# Patient Record
Sex: MALE | NOT HISPANIC OR LATINO | Employment: OTHER | URBAN - METROPOLITAN AREA
[De-identification: names, ages, dates, MRNs, and addresses within clinical notes are randomized per-mention and may not be internally consistent; named-entity substitution may affect disease eponyms.]

---

## 2021-10-14 ENCOUNTER — OFFICE VISIT (OUTPATIENT)
Dept: ORTHOPEDICS | Facility: CLINIC | Age: 66
End: 2021-10-14
Payer: MEDICARE

## 2021-10-14 DIAGNOSIS — M17.12 PRIMARY OSTEOARTHRITIS OF LEFT KNEE: ICD-10-CM

## 2021-10-14 DIAGNOSIS — M23.204 DEGENERATIVE TEAR OF LEFT MEDIAL MENISCUS: Primary | ICD-10-CM

## 2021-10-14 PROCEDURE — 1159F MED LIST DOCD IN RCRD: CPT | Mod: S$GLB,,, | Performed by: ORTHOPAEDIC SURGERY

## 2021-10-14 PROCEDURE — 20610 LARGE JOINT ASPIRATION/INJECTION: L KNEE: ICD-10-PCS | Mod: LT,S$GLB,, | Performed by: ORTHOPAEDIC SURGERY

## 2021-10-14 PROCEDURE — 1160F PR REVIEW ALL MEDS BY PRESCRIBER/CLIN PHARMACIST DOCUMENTED: ICD-10-PCS | Mod: S$GLB,,, | Performed by: ORTHOPAEDIC SURGERY

## 2021-10-14 PROCEDURE — 1101F PR PT FALLS ASSESS DOC 0-1 FALLS W/OUT INJ PAST YR: ICD-10-PCS | Mod: S$GLB,,, | Performed by: ORTHOPAEDIC SURGERY

## 2021-10-14 PROCEDURE — 1160F RVW MEDS BY RX/DR IN RCRD: CPT | Mod: S$GLB,,, | Performed by: ORTHOPAEDIC SURGERY

## 2021-10-14 PROCEDURE — 99203 PR OFFICE/OUTPT VISIT, NEW, LEVL III, 30-44 MIN: ICD-10-PCS | Mod: 25,S$GLB,, | Performed by: ORTHOPAEDIC SURGERY

## 2021-10-14 PROCEDURE — 20610 DRAIN/INJ JOINT/BURSA W/O US: CPT | Mod: LT,S$GLB,, | Performed by: ORTHOPAEDIC SURGERY

## 2021-10-14 PROCEDURE — 1101F PT FALLS ASSESS-DOCD LE1/YR: CPT | Mod: S$GLB,,, | Performed by: ORTHOPAEDIC SURGERY

## 2021-10-14 PROCEDURE — 3288F PR FALLS RISK ASSESSMENT DOCUMENTED: ICD-10-PCS | Mod: S$GLB,,, | Performed by: ORTHOPAEDIC SURGERY

## 2021-10-14 PROCEDURE — 3288F FALL RISK ASSESSMENT DOCD: CPT | Mod: S$GLB,,, | Performed by: ORTHOPAEDIC SURGERY

## 2021-10-14 PROCEDURE — 99203 OFFICE O/P NEW LOW 30 MIN: CPT | Mod: 25,S$GLB,, | Performed by: ORTHOPAEDIC SURGERY

## 2021-10-14 PROCEDURE — 1159F PR MEDICATION LIST DOCUMENTED IN MEDICAL RECORD: ICD-10-PCS | Mod: S$GLB,,, | Performed by: ORTHOPAEDIC SURGERY

## 2021-10-14 RX ORDER — TRIAMCINOLONE ACETONIDE 40 MG/ML
40 INJECTION, SUSPENSION INTRA-ARTICULAR; INTRAMUSCULAR
Status: DISCONTINUED | OUTPATIENT
Start: 2021-10-14 | End: 2021-10-14 | Stop reason: HOSPADM

## 2021-10-14 RX ORDER — OMEGA-3-ACID ETHYL ESTERS 1 G/1
2 CAPSULE, LIQUID FILLED ORAL DAILY
COMMUNITY
Start: 2021-05-08 | End: 2022-07-18 | Stop reason: SDUPTHER

## 2021-10-14 RX ADMIN — TRIAMCINOLONE ACETONIDE 40 MG: 40 INJECTION, SUSPENSION INTRA-ARTICULAR; INTRAMUSCULAR at 10:10

## 2021-11-11 ENCOUNTER — OFFICE VISIT (OUTPATIENT)
Dept: ORTHOPEDICS | Facility: CLINIC | Age: 66
End: 2021-11-11
Payer: MEDICARE

## 2021-11-11 VITALS — WEIGHT: 190 LBS | BODY MASS INDEX: 28.79 KG/M2 | HEIGHT: 68 IN

## 2021-11-11 DIAGNOSIS — M17.12 PRIMARY OSTEOARTHRITIS OF LEFT KNEE: ICD-10-CM

## 2021-11-11 DIAGNOSIS — M23.204 DEGENERATIVE TEAR OF LEFT MEDIAL MENISCUS: Primary | ICD-10-CM

## 2021-11-11 PROCEDURE — 1101F PT FALLS ASSESS-DOCD LE1/YR: CPT | Mod: S$GLB,,, | Performed by: ORTHOPAEDIC SURGERY

## 2021-11-11 PROCEDURE — 3288F FALL RISK ASSESSMENT DOCD: CPT | Mod: S$GLB,,, | Performed by: ORTHOPAEDIC SURGERY

## 2021-11-11 PROCEDURE — 1159F PR MEDICATION LIST DOCUMENTED IN MEDICAL RECORD: ICD-10-PCS | Mod: S$GLB,,, | Performed by: ORTHOPAEDIC SURGERY

## 2021-11-11 PROCEDURE — 99213 OFFICE O/P EST LOW 20 MIN: CPT | Mod: S$GLB,,, | Performed by: ORTHOPAEDIC SURGERY

## 2021-11-11 PROCEDURE — 3008F BODY MASS INDEX DOCD: CPT | Mod: S$GLB,,, | Performed by: ORTHOPAEDIC SURGERY

## 2021-11-11 PROCEDURE — 99213 PR OFFICE/OUTPT VISIT, EST, LEVL III, 20-29 MIN: ICD-10-PCS | Mod: S$GLB,,, | Performed by: ORTHOPAEDIC SURGERY

## 2021-11-11 PROCEDURE — 1160F PR REVIEW ALL MEDS BY PRESCRIBER/CLIN PHARMACIST DOCUMENTED: ICD-10-PCS | Mod: S$GLB,,, | Performed by: ORTHOPAEDIC SURGERY

## 2021-11-11 PROCEDURE — 1125F PR PAIN SEVERITY QUANTIFIED, PAIN PRESENT: ICD-10-PCS | Mod: S$GLB,,, | Performed by: ORTHOPAEDIC SURGERY

## 2021-11-11 PROCEDURE — 1125F AMNT PAIN NOTED PAIN PRSNT: CPT | Mod: S$GLB,,, | Performed by: ORTHOPAEDIC SURGERY

## 2021-11-11 PROCEDURE — 3288F PR FALLS RISK ASSESSMENT DOCUMENTED: ICD-10-PCS | Mod: S$GLB,,, | Performed by: ORTHOPAEDIC SURGERY

## 2021-11-11 PROCEDURE — 1101F PR PT FALLS ASSESS DOC 0-1 FALLS W/OUT INJ PAST YR: ICD-10-PCS | Mod: S$GLB,,, | Performed by: ORTHOPAEDIC SURGERY

## 2021-11-11 PROCEDURE — 3008F PR BODY MASS INDEX (BMI) DOCUMENTED: ICD-10-PCS | Mod: S$GLB,,, | Performed by: ORTHOPAEDIC SURGERY

## 2021-11-11 PROCEDURE — 1160F RVW MEDS BY RX/DR IN RCRD: CPT | Mod: S$GLB,,, | Performed by: ORTHOPAEDIC SURGERY

## 2021-11-11 PROCEDURE — 1159F MED LIST DOCD IN RCRD: CPT | Mod: S$GLB,,, | Performed by: ORTHOPAEDIC SURGERY

## 2021-11-19 ENCOUNTER — TELEPHONE (OUTPATIENT)
Dept: ORTHOPEDICS | Facility: CLINIC | Age: 66
End: 2021-11-19
Payer: MEDICARE

## 2021-11-22 ENCOUNTER — HOSPITAL ENCOUNTER (OUTPATIENT)
Dept: RADIOLOGY | Facility: HOSPITAL | Age: 66
Discharge: HOME | End: 2021-11-22
Attending: ORTHOPAEDIC SURGERY
Payer: MEDICARE

## 2021-11-22 ENCOUNTER — HOSPITAL ENCOUNTER (OUTPATIENT)
Dept: PREADMISSION TESTING | Facility: HOSPITAL | Age: 66
Discharge: HOME | End: 2021-11-22
Attending: ORTHOPAEDIC SURGERY
Payer: MEDICARE

## 2021-11-22 VITALS
WEIGHT: 190 LBS | BODY MASS INDEX: 28.79 KG/M2 | HEART RATE: 89 BPM | SYSTOLIC BLOOD PRESSURE: 138 MMHG | HEIGHT: 68 IN | TEMPERATURE: 99 F | OXYGEN SATURATION: 97 % | RESPIRATION RATE: 18 BRPM | DIASTOLIC BLOOD PRESSURE: 95 MMHG

## 2021-11-22 DIAGNOSIS — M23.204 DEGENERATIVE TEAR OF LEFT MEDIAL MENISCUS: ICD-10-CM

## 2021-11-22 DIAGNOSIS — M17.12 PRIMARY OSTEOARTHRITIS OF LEFT KNEE: ICD-10-CM

## 2021-11-22 PROCEDURE — 93005 ELECTROCARDIOGRAM TRACING: CPT | Performed by: INTERNAL MEDICINE

## 2021-11-22 PROCEDURE — 93010 ELECTROCARDIOGRAM REPORT: CPT | Mod: ,,, | Performed by: INTERNAL MEDICINE

## 2021-11-22 PROCEDURE — 93010 EKG 12-LEAD: ICD-10-PCS | Mod: ,,, | Performed by: INTERNAL MEDICINE

## 2021-11-22 PROCEDURE — 71046 X-RAY EXAM CHEST 2 VIEWS: CPT | Mod: TC

## 2021-11-24 ENCOUNTER — HOSPITAL ENCOUNTER (OUTPATIENT)
Facility: HOSPITAL | Age: 66
Discharge: HOME | End: 2021-11-24
Attending: ORTHOPAEDIC SURGERY | Admitting: ORTHOPAEDIC SURGERY
Payer: MEDICARE

## 2021-11-24 ENCOUNTER — ANESTHESIA EVENT (OUTPATIENT)
Dept: SURGERY | Facility: HOSPITAL | Age: 66
End: 2021-11-24
Payer: MEDICARE

## 2021-11-24 ENCOUNTER — ANESTHESIA (OUTPATIENT)
Dept: SURGERY | Facility: HOSPITAL | Age: 66
End: 2021-11-24
Payer: MEDICARE

## 2021-11-24 VITALS
RESPIRATION RATE: 14 BRPM | SYSTOLIC BLOOD PRESSURE: 144 MMHG | TEMPERATURE: 98 F | OXYGEN SATURATION: 98 % | BODY MASS INDEX: 28.74 KG/M2 | HEIGHT: 68 IN | WEIGHT: 189.63 LBS | DIASTOLIC BLOOD PRESSURE: 84 MMHG | HEART RATE: 74 BPM

## 2021-11-24 DIAGNOSIS — M23.204 DEGENERATIVE TEAR OF LEFT MEDIAL MENISCUS: Primary | ICD-10-CM

## 2021-11-24 DIAGNOSIS — M17.12 PRIMARY OSTEOARTHRITIS OF LEFT KNEE: ICD-10-CM

## 2021-11-24 LAB — SARS-COV-2 RDRP RESP QL NAA+PROBE: NEGATIVE

## 2021-11-24 PROCEDURE — 25000003 PHARM REV CODE 250: Performed by: ORTHOPAEDIC SURGERY

## 2021-11-24 PROCEDURE — 27202107 HC XP QUATRO SENSOR: Performed by: NURSE ANESTHETIST, CERTIFIED REGISTERED

## 2021-11-24 PROCEDURE — 71000015 HC POSTOP RECOV 1ST HR: Performed by: ORTHOPAEDIC SURGERY

## 2021-11-24 PROCEDURE — 27200651 HC AIRWAY, LMA: Performed by: NURSE ANESTHETIST, CERTIFIED REGISTERED

## 2021-11-24 PROCEDURE — 63600175 PHARM REV CODE 636 W HCPCS: Performed by: ORTHOPAEDIC SURGERY

## 2021-11-24 PROCEDURE — 25000003 PHARM REV CODE 250: Performed by: NURSE ANESTHETIST, CERTIFIED REGISTERED

## 2021-11-24 PROCEDURE — 36000710: Performed by: ORTHOPAEDIC SURGERY

## 2021-11-24 PROCEDURE — 63600175 PHARM REV CODE 636 W HCPCS: Performed by: NURSE ANESTHETIST, CERTIFIED REGISTERED

## 2021-11-24 PROCEDURE — 71000033 HC RECOVERY, INTIAL HOUR: Performed by: ORTHOPAEDIC SURGERY

## 2021-11-24 PROCEDURE — 37000008 HC ANESTHESIA 1ST 15 MINUTES: Performed by: ORTHOPAEDIC SURGERY

## 2021-11-24 PROCEDURE — 36000711: Performed by: ORTHOPAEDIC SURGERY

## 2021-11-24 PROCEDURE — U0002 COVID-19 LAB TEST NON-CDC: HCPCS | Performed by: ORTHOPAEDIC SURGERY

## 2021-11-24 PROCEDURE — 27000671 HC TUBING MICROBORE EXT: Performed by: NURSE ANESTHETIST, CERTIFIED REGISTERED

## 2021-11-24 PROCEDURE — 27000673 HC TUBING BLOOD Y: Performed by: NURSE ANESTHETIST, CERTIFIED REGISTERED

## 2021-11-24 PROCEDURE — 27000284 HC CANNULA NASAL: Performed by: NURSE ANESTHETIST, CERTIFIED REGISTERED

## 2021-11-24 PROCEDURE — 25000003 PHARM REV CODE 250: Performed by: ANESTHESIOLOGY

## 2021-11-24 PROCEDURE — 27202103: Performed by: NURSE ANESTHETIST, CERTIFIED REGISTERED

## 2021-11-24 PROCEDURE — 27201423 OPTIME MED/SURG SUP & DEVICES STERILE SUPPLY: Performed by: ORTHOPAEDIC SURGERY

## 2021-11-24 PROCEDURE — 37000009 HC ANESTHESIA EA ADD 15 MINS: Performed by: ORTHOPAEDIC SURGERY

## 2021-11-24 PROCEDURE — 27000653 HC CATH, IV CATHLN: Performed by: NURSE ANESTHETIST, CERTIFIED REGISTERED

## 2021-11-24 RX ORDER — ONDANSETRON 2 MG/ML
INJECTION INTRAMUSCULAR; INTRAVENOUS
Status: DISCONTINUED | OUTPATIENT
Start: 2021-11-24 | End: 2021-11-24

## 2021-11-24 RX ORDER — OXYCODONE AND ACETAMINOPHEN 7.5; 325 MG/1; MG/1
1 TABLET ORAL EVERY 4 HOURS PRN
Qty: 28 TABLET | Refills: 0 | Status: SHIPPED | OUTPATIENT
Start: 2021-11-24

## 2021-11-24 RX ORDER — DIPHENHYDRAMINE HYDROCHLORIDE 50 MG/ML
12.5 INJECTION INTRAMUSCULAR; INTRAVENOUS
Status: DISCONTINUED | OUTPATIENT
Start: 2021-11-24 | End: 2021-11-24 | Stop reason: HOSPADM

## 2021-11-24 RX ORDER — ACETAMINOPHEN 10 MG/ML
INJECTION, SOLUTION INTRAVENOUS
Status: DISCONTINUED | OUTPATIENT
Start: 2021-11-24 | End: 2021-11-24

## 2021-11-24 RX ORDER — SODIUM CHLORIDE 0.9 % (FLUSH) 0.9 %
10 SYRINGE (ML) INJECTION
Status: DISCONTINUED | OUTPATIENT
Start: 2021-11-24 | End: 2021-11-24 | Stop reason: HOSPADM

## 2021-11-24 RX ORDER — FENTANYL CITRATE 50 UG/ML
INJECTION, SOLUTION INTRAMUSCULAR; INTRAVENOUS
Status: DISCONTINUED | OUTPATIENT
Start: 2021-11-24 | End: 2021-11-24

## 2021-11-24 RX ORDER — LIDOCAINE HYDROCHLORIDE 20 MG/ML
INJECTION, SOLUTION EPIDURAL; INFILTRATION; INTRACAUDAL; PERINEURAL
Status: DISCONTINUED | OUTPATIENT
Start: 2021-11-24 | End: 2021-11-24

## 2021-11-24 RX ORDER — METHYLPREDNISOLONE ACETATE 80 MG/ML
INJECTION, SUSPENSION INTRA-ARTICULAR; INTRALESIONAL; INTRAMUSCULAR; SOFT TISSUE
Status: DISCONTINUED | OUTPATIENT
Start: 2021-11-24 | End: 2021-11-24 | Stop reason: HOSPADM

## 2021-11-24 RX ORDER — PROPOFOL 10 MG/ML
VIAL (ML) INTRAVENOUS
Status: DISCONTINUED | OUTPATIENT
Start: 2021-11-24 | End: 2021-11-24

## 2021-11-24 RX ORDER — DEXAMETHASONE SODIUM PHOSPHATE 4 MG/ML
INJECTION, SOLUTION INTRA-ARTICULAR; INTRALESIONAL; INTRAMUSCULAR; INTRAVENOUS; SOFT TISSUE
Status: DISCONTINUED | OUTPATIENT
Start: 2021-11-24 | End: 2021-11-24

## 2021-11-24 RX ORDER — SODIUM CHLORIDE, SODIUM LACTATE, POTASSIUM CHLORIDE, CALCIUM CHLORIDE 600; 310; 30; 20 MG/100ML; MG/100ML; MG/100ML; MG/100ML
INJECTION, SOLUTION INTRAVENOUS CONTINUOUS PRN
Status: DISCONTINUED | OUTPATIENT
Start: 2021-11-24 | End: 2021-11-24

## 2021-11-24 RX ORDER — HYDROCODONE BITARTRATE AND ACETAMINOPHEN 5; 325 MG/1; MG/1
1 TABLET ORAL EVERY 4 HOURS PRN
Status: CANCELLED | OUTPATIENT
Start: 2021-11-24

## 2021-11-24 RX ORDER — MUPIROCIN 20 MG/G
1 OINTMENT TOPICAL 2 TIMES DAILY
Status: CANCELLED | OUTPATIENT
Start: 2021-11-24 | End: 2021-11-29

## 2021-11-24 RX ORDER — BUPIVACAINE HYDROCHLORIDE AND EPINEPHRINE 5; 5 MG/ML; UG/ML
INJECTION, SOLUTION EPIDURAL; INTRACAUDAL; PERINEURAL
Status: DISCONTINUED | OUTPATIENT
Start: 2021-11-24 | End: 2021-11-24 | Stop reason: HOSPADM

## 2021-11-24 RX ORDER — CEFAZOLIN SODIUM 2 G/50ML
2 SOLUTION INTRAVENOUS
Status: COMPLETED | OUTPATIENT
Start: 2021-11-24 | End: 2021-11-24

## 2021-11-24 RX ORDER — HYDROMORPHONE HYDROCHLORIDE 1 MG/ML
0.2 INJECTION, SOLUTION INTRAMUSCULAR; INTRAVENOUS; SUBCUTANEOUS
Status: DISCONTINUED | OUTPATIENT
Start: 2021-11-24 | End: 2021-11-24 | Stop reason: HOSPADM

## 2021-11-24 RX ORDER — OXYCODONE HYDROCHLORIDE 5 MG/1
5 TABLET ORAL
Status: DISCONTINUED | OUTPATIENT
Start: 2021-11-24 | End: 2021-11-24 | Stop reason: HOSPADM

## 2021-11-24 RX ORDER — MIDAZOLAM HYDROCHLORIDE 1 MG/ML
INJECTION INTRAMUSCULAR; INTRAVENOUS
Status: DISCONTINUED | OUTPATIENT
Start: 2021-11-24 | End: 2021-11-24

## 2021-11-24 RX ORDER — ONDANSETRON 2 MG/ML
4 INJECTION INTRAMUSCULAR; INTRAVENOUS DAILY PRN
Status: DISCONTINUED | OUTPATIENT
Start: 2021-11-24 | End: 2021-11-24 | Stop reason: HOSPADM

## 2021-11-24 RX ORDER — LIDOCAINE HYDROCHLORIDE 20 MG/ML
JELLY TOPICAL
Status: DISCONTINUED | OUTPATIENT
Start: 2021-11-24 | End: 2021-11-24

## 2021-11-24 RX ORDER — MEPERIDINE HYDROCHLORIDE 50 MG/ML
12.5 INJECTION INTRAMUSCULAR; INTRAVENOUS; SUBCUTANEOUS EVERY 10 MIN PRN
Status: DISCONTINUED | OUTPATIENT
Start: 2021-11-24 | End: 2021-11-24 | Stop reason: HOSPADM

## 2021-11-24 RX ADMIN — SODIUM CHLORIDE, SODIUM LACTATE, POTASSIUM CHLORIDE, AND CALCIUM CHLORIDE: .6; .31; .03; .02 INJECTION, SOLUTION INTRAVENOUS at 11:11

## 2021-11-24 RX ADMIN — LIDOCAINE HYDROCHLORIDE 60 MG: 20 INJECTION, SOLUTION EPIDURAL; INFILTRATION; INTRACAUDAL; PERINEURAL at 12:11

## 2021-11-24 RX ADMIN — DEXAMETHASONE SODIUM PHOSPHATE 8 MG: 4 INJECTION, SOLUTION INTRAMUSCULAR; INTRAVENOUS at 12:11

## 2021-11-24 RX ADMIN — FENTANYL CITRATE 100 MCG: 50 INJECTION INTRAMUSCULAR; INTRAVENOUS at 12:11

## 2021-11-24 RX ADMIN — ACETAMINOPHEN 1000 MG: 10 INJECTION, SOLUTION INTRAVENOUS at 11:11

## 2021-11-24 RX ADMIN — CEFAZOLIN SODIUM 2 G: 2 SOLUTION INTRAVENOUS at 11:11

## 2021-11-24 RX ADMIN — OXYCODONE HYDROCHLORIDE 5 MG: 5 TABLET ORAL at 01:11

## 2021-11-24 RX ADMIN — SODIUM CHLORIDE, SODIUM LACTATE, POTASSIUM CHLORIDE, AND CALCIUM CHLORIDE: .6; .31; .03; .02 INJECTION, SOLUTION INTRAVENOUS at 12:11

## 2021-11-24 RX ADMIN — LIDOCAINE HYDROCHLORIDE 4 ML: 20 JELLY TOPICAL at 12:11

## 2021-11-24 RX ADMIN — ONDANSETRON 4 MG: 2 INJECTION INTRAMUSCULAR; INTRAVENOUS at 12:11

## 2021-11-24 RX ADMIN — PROPOFOL 200 MG: 10 INJECTION, EMULSION INTRAVENOUS at 12:11

## 2021-11-24 RX ADMIN — MIDAZOLAM HYDROCHLORIDE 2 MG: 1 INJECTION, SOLUTION INTRAMUSCULAR; INTRAVENOUS at 12:11

## 2021-12-08 ENCOUNTER — OFFICE VISIT (OUTPATIENT)
Dept: ORTHOPEDICS | Facility: CLINIC | Age: 66
End: 2021-12-08
Payer: MEDICARE

## 2021-12-08 VITALS — HEART RATE: 84 BPM | HEIGHT: 68 IN | WEIGHT: 189 LBS | OXYGEN SATURATION: 100 % | BODY MASS INDEX: 28.64 KG/M2

## 2021-12-08 DIAGNOSIS — Z98.890 S/P LEFT KNEE ARTHROSCOPY: Primary | ICD-10-CM

## 2021-12-08 PROCEDURE — 99024 PR POST-OP FOLLOW-UP VISIT: ICD-10-PCS | Mod: S$GLB,,, | Performed by: PHYSICIAN ASSISTANT

## 2021-12-08 PROCEDURE — 99024 POSTOP FOLLOW-UP VISIT: CPT | Mod: S$GLB,,, | Performed by: PHYSICIAN ASSISTANT

## 2022-01-06 ENCOUNTER — OFFICE VISIT (OUTPATIENT)
Dept: ORTHOPEDICS | Facility: CLINIC | Age: 67
End: 2022-01-06
Payer: MEDICARE

## 2022-01-06 VITALS
HEIGHT: 68 IN | SYSTOLIC BLOOD PRESSURE: 142 MMHG | WEIGHT: 180 LBS | DIASTOLIC BLOOD PRESSURE: 94 MMHG | BODY MASS INDEX: 27.28 KG/M2

## 2022-01-06 DIAGNOSIS — Z98.890 S/P LEFT KNEE ARTHROSCOPY: Primary | ICD-10-CM

## 2022-01-06 PROCEDURE — 99024 PR POST-OP FOLLOW-UP VISIT: ICD-10-PCS | Mod: S$GLB,,, | Performed by: ORTHOPAEDIC SURGERY

## 2022-01-06 PROCEDURE — 3008F BODY MASS INDEX DOCD: CPT | Mod: S$GLB,,, | Performed by: ORTHOPAEDIC SURGERY

## 2022-01-06 PROCEDURE — 1100F PTFALLS ASSESS-DOCD GE2>/YR: CPT | Mod: S$GLB,,, | Performed by: ORTHOPAEDIC SURGERY

## 2022-01-06 PROCEDURE — 3077F SYST BP >= 140 MM HG: CPT | Mod: S$GLB,,, | Performed by: ORTHOPAEDIC SURGERY

## 2022-01-06 PROCEDURE — 1159F PR MEDICATION LIST DOCUMENTED IN MEDICAL RECORD: ICD-10-PCS | Mod: S$GLB,,, | Performed by: ORTHOPAEDIC SURGERY

## 2022-01-06 PROCEDURE — 1100F PR PT FALLS ASSESS DOC 2+ FALLS/FALL W/INJURY/YR: ICD-10-PCS | Mod: S$GLB,,, | Performed by: ORTHOPAEDIC SURGERY

## 2022-01-06 PROCEDURE — 3077F PR MOST RECENT SYSTOLIC BLOOD PRESSURE >= 140 MM HG: ICD-10-PCS | Mod: S$GLB,,, | Performed by: ORTHOPAEDIC SURGERY

## 2022-01-06 PROCEDURE — 99024 POSTOP FOLLOW-UP VISIT: CPT | Mod: S$GLB,,, | Performed by: ORTHOPAEDIC SURGERY

## 2022-01-06 PROCEDURE — 3080F PR MOST RECENT DIASTOLIC BLOOD PRESSURE >= 90 MM HG: ICD-10-PCS | Mod: S$GLB,,, | Performed by: ORTHOPAEDIC SURGERY

## 2022-01-06 PROCEDURE — 3080F DIAST BP >= 90 MM HG: CPT | Mod: S$GLB,,, | Performed by: ORTHOPAEDIC SURGERY

## 2022-01-06 PROCEDURE — 1160F PR REVIEW ALL MEDS BY PRESCRIBER/CLIN PHARMACIST DOCUMENTED: ICD-10-PCS | Mod: S$GLB,,, | Performed by: ORTHOPAEDIC SURGERY

## 2022-01-06 PROCEDURE — 1159F MED LIST DOCD IN RCRD: CPT | Mod: S$GLB,,, | Performed by: ORTHOPAEDIC SURGERY

## 2022-01-06 PROCEDURE — 3288F FALL RISK ASSESSMENT DOCD: CPT | Mod: S$GLB,,, | Performed by: ORTHOPAEDIC SURGERY

## 2022-01-06 PROCEDURE — 3008F PR BODY MASS INDEX (BMI) DOCUMENTED: ICD-10-PCS | Mod: S$GLB,,, | Performed by: ORTHOPAEDIC SURGERY

## 2022-01-06 PROCEDURE — 1160F RVW MEDS BY RX/DR IN RCRD: CPT | Mod: S$GLB,,, | Performed by: ORTHOPAEDIC SURGERY

## 2022-01-06 PROCEDURE — 1126F PR PAIN SEVERITY QUANTIFIED, NO PAIN PRESENT: ICD-10-PCS | Mod: S$GLB,,, | Performed by: ORTHOPAEDIC SURGERY

## 2022-01-06 PROCEDURE — 3288F PR FALLS RISK ASSESSMENT DOCUMENTED: ICD-10-PCS | Mod: S$GLB,,, | Performed by: ORTHOPAEDIC SURGERY

## 2022-01-06 PROCEDURE — 1126F AMNT PAIN NOTED NONE PRSNT: CPT | Mod: S$GLB,,, | Performed by: ORTHOPAEDIC SURGERY

## 2022-01-06 NOTE — PROGRESS NOTES
[facility] ORTHOPEDICS POST-OP NOTE    Subjective:           Chief Complaint:   Chief Complaint   Patient presents with    Left Knee - Post-op Evaluation     Lt knee Scope 11/24/21. Pt is doing well, no pain, Stiffness with activity. Fell 2 weeks ago.        Past Medical History:   Diagnosis Date    Fractures 1982    right leg -femur-       Past Surgical History:   Procedure Laterality Date    KNEE ARTHROSCOPY W/ MENISCECTOMY Left 11/24/2021    Procedure: ARTHROSCOPY, KNEE, WITH MENISCECTOMY;  Surgeon: Tobias Rich MD;  Location: [facility];  Service: Orthopedics;  Laterality: Left;  notified Arthrex 11/18, wm    TONSILLECTOMY  1963    UMBILICAL HERNIA REPAIR      x2 umbilical left inguinal       Current Outpatient Medications   Medication Sig    omega-3 acid ethyl esters (LOVAZA) 1 gram capsule Take 2 g by mouth once daily.    oxyCODONE-acetaminophen (PERCOCET) 7.5-325 mg per tablet Take 1 tablet by mouth every 4 (four) hours as needed for Pain. (Patient not taking: No sig reported)     No current facility-administered medications for this visit.       Review of patient's allergies indicates:  No Known Allergies    No family history on file.    Social History     Socioeconomic History    Marital status:    Tobacco Use    Smoking status: Never Smoker    Smokeless tobacco: Never Used   Substance and Sexual Activity    Alcohol use: Yes     Comment: very seldom    Drug use: Never       History of present illness:  Patient comes in today for his left knee.  He was doing great after arthroscopy with any tripped in the woods hunting and twisted his knee.  He had a lot of pain but that is resolved and now is not having any pain      Review of Systems:    Musculoskeletal:  See HPI      Objective:        Physical Examination:    Vital Signs:    Vitals:    01/06/22 0853   BP: (!) 142/94       Body mass index is 27.37 kg/m².    This a well-developed, well nourished patient in no acute distress.  They are alert  and oriented and cooperative to examination.        Patient has full range of motion of the left knee.  He has no effusion today he is portals are well healed.  He has mild medial joint line tenderness  Pertinent New Results:    XRAY Report / Interpretation:   Three views of the left knee demonstrate advanced arthritis of the left knee with loss of the medial compartment    Assessment/Plan:      Left knee osteoarthritis.  He is doing well with out any real intervention.  His pain is resolved.  He will follow-up p.r.n..    This note was created using Dragon voice recognition software that occasionally misinterpreted phrases or words.

## 2022-04-28 ENCOUNTER — OFFICE VISIT (OUTPATIENT)
Dept: FAMILY MEDICINE | Facility: CLINIC | Age: 67
End: 2022-04-28
Payer: MEDICARE

## 2022-04-28 VITALS
DIASTOLIC BLOOD PRESSURE: 78 MMHG | BODY MASS INDEX: 26.67 KG/M2 | HEART RATE: 78 BPM | HEIGHT: 68 IN | WEIGHT: 176 LBS | SYSTOLIC BLOOD PRESSURE: 124 MMHG

## 2022-04-28 DIAGNOSIS — M17.12 PRIMARY OSTEOARTHRITIS OF LEFT KNEE: Primary | ICD-10-CM

## 2022-04-28 DIAGNOSIS — S31.149A: ICD-10-CM

## 2022-04-28 DIAGNOSIS — Z12.5 SPECIAL SCREENING FOR MALIGNANT NEOPLASM OF PROSTATE: ICD-10-CM

## 2022-04-28 DIAGNOSIS — E78.2 MIXED HYPERLIPIDEMIA: ICD-10-CM

## 2022-04-28 PROCEDURE — 3074F SYST BP LT 130 MM HG: CPT | Mod: S$GLB,,, | Performed by: FAMILY MEDICINE

## 2022-04-28 PROCEDURE — 99204 PR OFFICE/OUTPT VISIT, NEW, LEVL IV, 45-59 MIN: ICD-10-PCS | Mod: S$GLB,,, | Performed by: FAMILY MEDICINE

## 2022-04-28 PROCEDURE — 3008F PR BODY MASS INDEX (BMI) DOCUMENTED: ICD-10-PCS | Mod: S$GLB,,, | Performed by: FAMILY MEDICINE

## 2022-04-28 PROCEDURE — 3288F FALL RISK ASSESSMENT DOCD: CPT | Mod: S$GLB,,, | Performed by: FAMILY MEDICINE

## 2022-04-28 PROCEDURE — 1159F PR MEDICATION LIST DOCUMENTED IN MEDICAL RECORD: ICD-10-PCS | Mod: S$GLB,,, | Performed by: FAMILY MEDICINE

## 2022-04-28 PROCEDURE — 99204 OFFICE O/P NEW MOD 45 MIN: CPT | Mod: S$GLB,,, | Performed by: FAMILY MEDICINE

## 2022-04-28 PROCEDURE — 3078F PR MOST RECENT DIASTOLIC BLOOD PRESSURE < 80 MM HG: ICD-10-PCS | Mod: S$GLB,,, | Performed by: FAMILY MEDICINE

## 2022-04-28 PROCEDURE — 3288F PR FALLS RISK ASSESSMENT DOCUMENTED: ICD-10-PCS | Mod: S$GLB,,, | Performed by: FAMILY MEDICINE

## 2022-04-28 PROCEDURE — 1101F PR PT FALLS ASSESS DOC 0-1 FALLS W/OUT INJ PAST YR: ICD-10-PCS | Mod: S$GLB,,, | Performed by: FAMILY MEDICINE

## 2022-04-28 PROCEDURE — 3078F DIAST BP <80 MM HG: CPT | Mod: S$GLB,,, | Performed by: FAMILY MEDICINE

## 2022-04-28 PROCEDURE — 1101F PT FALLS ASSESS-DOCD LE1/YR: CPT | Mod: S$GLB,,, | Performed by: FAMILY MEDICINE

## 2022-04-28 PROCEDURE — 3074F PR MOST RECENT SYSTOLIC BLOOD PRESSURE < 130 MM HG: ICD-10-PCS | Mod: S$GLB,,, | Performed by: FAMILY MEDICINE

## 2022-04-28 PROCEDURE — 3008F BODY MASS INDEX DOCD: CPT | Mod: S$GLB,,, | Performed by: FAMILY MEDICINE

## 2022-04-28 PROCEDURE — 1159F MED LIST DOCD IN RCRD: CPT | Mod: S$GLB,,, | Performed by: FAMILY MEDICINE

## 2022-04-28 NOTE — PROGRESS NOTES
SUBJECTIVE:    Patient ID: Teodoro Sosa is a 66 y.o. male.    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  He recently had a left knee arthroscopy with Dr. Rich.  He was found have torn meniscus and osteoarthritis of the knee.  He does have residual discomfort in the knee but does not take any active NSAIDs or pain medication for this.  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    Nonsmoker and drinks beer socially.    Hyperlipidemia on Lovaza 1 tablet a day.    2014-colonoscopy with Dr. Morel[facility] 10 years    Years ago patient had a accident on the job.  [place] nail gun misfired and  some metal entered his abdomen ,lodged in  his liver.  After proper workup it was  decided to leave the metal in his liver.  Visible on chest x-ray.      Admission on 11/24/2021, Discharged on 11/24/2021   Component Date Value Ref Range Status    SARS-CoV-2 RNA, Amplification, Qual 11/24/2021 Negative  Negative Final   Lab Visit on 11/22/2021   Component Date Value Ref Range Status    Sodium 11/22/2021 138  136 - 145 mmol/L Final    Potassium 11/22/2021 4.0  3.5 - 5.1 mmol/L Final    Chloride 11/22/2021 103  95 - 110 mmol/L Final    CO2 11/22/2021 26  23 - 29 mmol/L Final    Glucose 11/22/2021 99  70 - 110 mg/dL Final    BUN 11/22/2021 25 (A) 8 - 23 mg/dL Final    Creatinine 11/22/2021 1.3  0.5 - 1.4 mg/dL Final    Calcium 11/22/2021 9.2  8.7 - 10.5 mg/dL Final    Total Protein 11/22/2021 7.5  6.0 - 8.4 g/dL Final    Albumin 11/22/2021 3.9  3.5 - 5.2 g/dL Final    Total Bilirubin 11/22/2021 0.6  0.1 - 1.0 mg/dL Final    Alkaline Phosphatase 11/22/2021 65  55 - 135 U/L Final    AST 11/22/2021 15  10 - 40 U/L Final    ALT 11/22/2021 21  10 - 44 U/L Final    Anion Gap 11/22/2021 9  8 - 16 mmol/L Final    eGFR if African American 11/22/2021 >60.0  >60  mL/min/1.73 m^2 Final    eGFR if non African American 11/22/2021 56.9 (A) >60 mL/min/1.73 m^2 Final    WBC 11/22/2021 6.47  3.90 - 12.70 K/uL Final    RBC 11/22/2021 4.82  4.60 - 6.20 M/uL Final    Hemoglobin 11/22/2021 14.3  14.0 - 18.0 g/dL Final    Hematocrit 11/22/2021 43.0  40.0 - 54.0 % Final    MCV 11/22/2021 89  82 - 98 fL Final    MCH 11/22/2021 29.7  27.0 - 31.0 pg Final    MCHC 11/22/2021 33.3  32.0 - 36.0 g/dL Final    RDW 11/22/2021 12.6  11.5 - 14.5 % Final    Platelets 11/22/2021 288  150 - 450 K/uL Final    MPV 11/22/2021 8.4 (A) 9.2 - 12.9 fL Final    Immature Granulocytes 11/22/2021 0.3  0.0 - 0.5 % Final    Gran # (ANC) 11/22/2021 4.2  1.8 - 7.7 K/uL Final    Immature Grans (Abs) 11/22/2021 0.02  0.00 - 0.04 K/uL Final    Lymph # 11/22/2021 1.6  1.0 - 4.8 K/uL Final    Mono # 11/22/2021 0.6  0.3 - 1.0 K/uL Final    Eos # 11/22/2021 0.1  0.0 - 0.5 K/uL Final    Baso # 11/22/2021 0.04  0.00 - 0.20 K/uL Final    nRBC 11/22/2021 0  0 /100 WBC Final    Gran % 11/22/2021 64.3  38.0 - 73.0 % Final    Lymph % 11/22/2021 24.6  18.0 - 48.0 % Final    Mono % 11/22/2021 8.5  4.0 - 15.0 % Final    Eosinophil % 11/22/2021 1.7  0.0 - 8.0 % Final    Basophil % 11/22/2021 0.6  0.0 - 1.9 % Final    Differential Method 11/22/2021 Automated   Final       Past Medical History:   Diagnosis Date    Fractures 1982    right leg -femur-    Primary osteoarthritis of left knee 4/28/2022     Social History     Socioeconomic History    Marital status:    Tobacco Use    Smoking status: Never Smoker    Smokeless tobacco: Never Used   Substance and Sexual Activity    Alcohol use: Yes     Comment: very seldom    Drug use: Never     Past Surgical History:   Procedure Laterality Date    KNEE ARTHROSCOPY W/ MENISCECTOMY Left 11/24/2021    Procedure: ARTHROSCOPY, KNEE, WITH MENISCECTOMY;  Surgeon: Tobias Rich MD;  Location: [facility];  Service: Orthopedics;  Laterality: Left;  notified Arthrex  "11/18,     TONSILLECTOMY  1963    UMBILICAL HERNIA REPAIR      x2 umbilical left inguinal     No family history on file.    Review of patient's allergies indicates:  No Known Allergies    Current Outpatient Medications:     omega-3 acid ethyl esters (LOVAZA) 1 gram capsule, Take 2 g by mouth once daily., Disp: , Rfl:     oxyCODONE-acetaminophen (PERCOCET) 7.5-325 mg per tablet, Take 1 tablet by mouth every 4 (four) hours as needed for Pain. (Patient not taking: No sig reported), Disp: 28 tablet, Rfl: 0    Review of Systems   Constitutional: Negative for appetite change, chills, fatigue, fever and unexpected weight change.   HENT: Negative for congestion, ear pain and trouble swallowing.    Eyes: Negative for pain, discharge and visual disturbance.   Respiratory: Negative for apnea, cough, shortness of breath and wheezing.    Cardiovascular: Negative for chest pain and leg swelling.   Gastrointestinal: Negative for abdominal pain, blood in stool, constipation, diarrhea, nausea and vomiting.   Endocrine: Negative for cold intolerance, heat intolerance and polydipsia.   Genitourinary: Negative for dysuria, hematuria, testicular pain and urgency.        Nocturia  1 x  nite   Musculoskeletal: Positive for arthralgias (Left knee arthritis, limping occasionally.) and gait problem. Negative for joint swelling and myalgias.   Neurological: Negative for dizziness, seizures and numbness.   Psychiatric/Behavioral: Negative for agitation, behavioral problems, hallucinations and sleep disturbance. The patient is not nervous/anxious.           Objective:      Vitals:    04/28/22 0921   BP: 124/78   Pulse: 78   Weight: 79.8 kg (176 lb)   Height: 5' 8" (1.727 m)     Physical Exam  Vitals and nursing note reviewed.   Constitutional:       General: He is not in acute distress.     Appearance: Normal appearance. He is well-developed and normal weight. He is not toxic-appearing.   HENT:      Head: Normocephalic and atraumatic.     "  Right Ear: Tympanic membrane and external ear normal.      Left Ear: Tympanic membrane and external ear normal.      Nose: Nose normal.      Mouth/Throat:      Pharynx: Oropharynx is clear.   Eyes:      Pupils: Pupils are equal, round, and reactive to light.   Neck:      Thyroid: No thyromegaly.      Vascular: No carotid bruit.   Cardiovascular:      Rate and Rhythm: Normal rate and regular rhythm.      Heart sounds: Normal heart sounds. No murmur heard.  Pulmonary:      Effort: Pulmonary effort is normal.      Breath sounds: Normal breath sounds. No wheezing or rales.   Abdominal:      General: Bowel sounds are normal. There is no distension.      Palpations: Abdomen is soft.      Tenderness: There is no abdominal tenderness.   Musculoskeletal:         General: No tenderness or deformity. Normal range of motion.      Cervical back: Normal range of motion and neck supple.      Lumbar back: Normal. No spasms.      Comments: Bends 90 degrees at  Waist, shoulders a has good range of motion knees have good range of motion no edema to lower extremities   Lymphadenopathy:      Cervical: No cervical adenopathy.   Skin:     General: Skin is warm and dry.      Findings: No rash.      Comments: Crusty lesion to the dorsum of both hands.   Neurological:      Mental Status: He is alert and oriented to person, place, and time. Mental status is at baseline.      Cranial Nerves: No cranial nerve deficit.      Coordination: Coordination normal.   Psychiatric:         Mood and Affect: Mood normal.         Behavior: Behavior normal.         Thought Content: Thought content normal.         Judgment: Judgment normal.           Assessment:       1. Primary osteoarthritis of left knee    2. Mixed hyperlipidemia    3. Special screening for malignant neoplasm of prostate    4. Puncture wound of abdomen with foreign body         Plan:       Primary osteoarthritis of left knee  Continue conservative management for left knee, he may need  injections of the knee if pain increases.  Mixed hyperlipidemia  -     CBC Auto Differential; Future; Expected date: 04/28/2022  -     Comprehensive Metabolic Panel; Future; Expected date: 04/28/2022  -     Lipid Panel; Future; Expected date: 04/28/2022  -     PSA, Screening; Future; Expected date: 04/28/2022  -     TSH w/reflex to FT4; Future; Expected date: 04/28/2022  -     Urinalysis, Reflex to Urine Culture Urine, Clean Catch; Future; Expected date: 04/28/2022  Hyperlipidemia, continue Lovaza.  Recheck yearly lab work  Special screening for malignant neoplasm of prostate  -     PSA, Screening; Future; Expected date: 04/28/2022  Needs a PSA    No follow-ups on file.        4/28/2022 Pritesh Casillas

## 2022-05-05 LAB
ALBUMIN SERPL-MCNC: 4.1 G/DL (ref 3.6–5.1)
ALBUMIN/GLOB SERPL: 1.6 (CALC) (ref 1–2.5)
ALP SERPL-CCNC: 75 U/L (ref 35–144)
ALT SERPL-CCNC: 16 U/L (ref 9–46)
APPEARANCE UR: CLEAR
AST SERPL-CCNC: 12 U/L (ref 10–35)
BACTERIA #/AREA URNS HPF: NORMAL /HPF
BACTERIA UR CULT: NORMAL
BASOPHILS # BLD AUTO: 50 CELLS/UL (ref 0–200)
BASOPHILS NFR BLD AUTO: 1.1 %
BILIRUB SERPL-MCNC: 0.6 MG/DL (ref 0.2–1.2)
BILIRUB UR QL STRIP: NEGATIVE
BUN SERPL-MCNC: 16 MG/DL (ref 7–25)
BUN/CREAT SERPL: NORMAL (CALC) (ref 6–22)
CALCIUM SERPL-MCNC: 9.1 MG/DL (ref 8.6–10.3)
CHLORIDE SERPL-SCNC: 102 MMOL/L (ref 98–110)
CHOLEST SERPL-MCNC: 241 MG/DL
CHOLEST/HDLC SERPL: 4.5 (CALC)
CO2 SERPL-SCNC: 27 MMOL/L (ref 20–32)
COLOR UR: YELLOW
CREAT SERPL-MCNC: 0.83 MG/DL (ref 0.7–1.25)
EOSINOPHIL # BLD AUTO: 149 CELLS/UL (ref 15–500)
EOSINOPHIL NFR BLD AUTO: 3.3 %
ERYTHROCYTE [DISTWIDTH] IN BLOOD BY AUTOMATED COUNT: 12.7 % (ref 11–15)
GLOBULIN SER CALC-MCNC: 2.5 G/DL (CALC) (ref 1.9–3.7)
GLUCOSE SERPL-MCNC: 95 MG/DL (ref 65–99)
GLUCOSE UR QL STRIP: NEGATIVE
HCT VFR BLD AUTO: 42.5 % (ref 38.5–50)
HDLC SERPL-MCNC: 54 MG/DL
HGB BLD-MCNC: 13.9 G/DL (ref 13.2–17.1)
HGB UR QL STRIP: NEGATIVE
HYALINE CASTS #/AREA URNS LPF: NORMAL /LPF
KETONES UR QL STRIP: NEGATIVE
LDLC SERPL CALC-MCNC: 172 MG/DL (CALC)
LEUKOCYTE ESTERASE UR QL STRIP: NEGATIVE
LYMPHOCYTES # BLD AUTO: 1404 CELLS/UL (ref 850–3900)
LYMPHOCYTES NFR BLD AUTO: 31.2 %
MCH RBC QN AUTO: 29.4 PG (ref 27–33)
MCHC RBC AUTO-ENTMCNC: 32.7 G/DL (ref 32–36)
MCV RBC AUTO: 90 FL (ref 80–100)
MONOCYTES # BLD AUTO: 531 CELLS/UL (ref 200–950)
MONOCYTES NFR BLD AUTO: 11.8 %
NEUTROPHILS # BLD AUTO: 2367 CELLS/UL (ref 1500–7800)
NEUTROPHILS NFR BLD AUTO: 52.6 %
NITRITE UR QL STRIP: NEGATIVE
NONHDLC SERPL-MCNC: 187 MG/DL (CALC)
PH UR STRIP: 6 [PH] (ref 5–8)
PLATELET # BLD AUTO: 250 THOUSAND/UL (ref 140–400)
PMV BLD REES-ECKER: 10 FL (ref 7.5–12.5)
POTASSIUM SERPL-SCNC: 4.2 MMOL/L (ref 3.5–5.3)
PROT SERPL-MCNC: 6.6 G/DL (ref 6.1–8.1)
PROT UR QL STRIP: NEGATIVE
PSA SERPL-MCNC: 0.4 NG/ML
RBC # BLD AUTO: 4.72 MILLION/UL (ref 4.2–5.8)
RBC #/AREA URNS HPF: NORMAL /HPF
SODIUM SERPL-SCNC: 135 MMOL/L (ref 135–146)
SP GR UR STRIP: 1.01 (ref 1–1.03)
SQUAMOUS #/AREA URNS HPF: NORMAL /HPF
TRIGL SERPL-MCNC: 62 MG/DL
TSH SERPL-ACNC: 1 MIU/L (ref 0.4–4.5)
WBC # BLD AUTO: 4.5 THOUSAND/UL (ref 3.8–10.8)
WBC #/AREA URNS HPF: NORMAL /HPF

## 2022-05-08 NOTE — PROGRESS NOTES
Call patient.  Cholesterol moderately elevated 241. Bad cholesterol, LDL, is high at 172 triglycerides good at 62. Prostate normal with a PSA of 0.4, the rest of the labs are completely normal.  I recommend trying a low-dose rosuvastatin 5 mg 3 times a week, Monday Wednesday Friday.  Recheck CMP lipids in 3 months.  Cut back on fried food and fast food.

## 2022-05-09 ENCOUNTER — TELEPHONE (OUTPATIENT)
Dept: FAMILY MEDICINE | Facility: CLINIC | Age: 67
End: 2022-05-09

## 2022-05-09 DIAGNOSIS — Z79.899 ENCOUNTER FOR LONG-TERM (CURRENT) USE OF OTHER MEDICATIONS: ICD-10-CM

## 2022-05-09 DIAGNOSIS — E78.2 MIXED HYPERLIPIDEMIA: Primary | ICD-10-CM

## 2022-05-09 RX ORDER — ROSUVASTATIN CALCIUM 5 MG/1
5 TABLET, COATED ORAL
Qty: 36 TABLET | Refills: 0 | Status: SHIPPED | OUTPATIENT
Start: 2022-05-09 | End: 2022-07-18

## 2022-05-09 NOTE — TELEPHONE ENCOUNTER
----- Message from Pritesh Casillas MD sent at 5/8/2022  3:58 PM CDT -----  Call patient.  Cholesterol moderately elevated 241. Bad cholesterol, LDL, is high at 172 triglycerides good at 62. Prostate normal with a PSA of 0.4, the rest of the labs are completely normal.  I recommend trying a low-dose rosuvastatin 5 mg 3 times a week, Monday Wednesday Friday.  Recheck CMP lipids in 3 months.  Cut back on fried food and fast food.

## 2022-05-09 NOTE — TELEPHONE ENCOUNTER
Spoke to patient with results verbatim per Dr Casillas. Verbalized understanding on all and agrees to take Rosuvastatin 3 times a week. Orders pended for that and lab. Remind me created.

## 2022-07-15 NOTE — TELEPHONE ENCOUNTER
----- Message from Angela Shell sent at 7/15/2022 11:02 AM CDT -----  Patient called and stated that the doctor put him on rosuvastatin and it is making his back hurt and he would like to talk to the nurse about having the medicine changed please call [phone]

## 2022-07-15 NOTE — TELEPHONE ENCOUNTER
Spoke with pt who took Lipitor before this and he is unable to handle that either. The only thing he has ever been able to handle was Lovaza.

## 2022-07-18 RX ORDER — EZETIMIBE 10 MG/1
10 TABLET ORAL DAILY
Qty: 30 TABLET | Refills: 2 | Status: SHIPPED | OUTPATIENT
Start: 2022-07-18 | End: 2023-03-30

## 2022-07-18 RX ORDER — OMEGA-3-ACID ETHYL ESTERS 1 G/1
2 CAPSULE, LIQUID FILLED ORAL DAILY
Qty: 60 CAPSULE | Refills: 5 | Status: SHIPPED | OUTPATIENT
Start: 2022-07-18 | End: 2022-12-19 | Stop reason: SDUPTHER

## 2022-07-18 NOTE — TELEPHONE ENCOUNTER
"Per Dr. Casillas "okay to stop rosuvastatin, continue lovaza, add zetia 10mg po daily also #30 2 refills. Recheck cmp and lipids in 3 months." spoke with pt, remind me created   "

## 2022-08-02 ENCOUNTER — TELEPHONE (OUTPATIENT)
Dept: FAMILY MEDICINE | Facility: CLINIC | Age: 67
End: 2022-08-02

## 2022-08-02 NOTE — TELEPHONE ENCOUNTER
Left message that fasting lab is due to recheck cholesterol, orders at [facility], and to try to have drawn in the next 2 weeks. Updated remind me.

## 2022-08-02 NOTE — TELEPHONE ENCOUNTER
----- Message from [facility], RT sent at 5/9/2022  3:56 PM CDT -----  Regarding: Lab due  Pritesh Casillas MD   5/8/2022  3:58 PM CDT Back to Top      Call patient.  Cholesterol moderately elevated 241. Bad cholesterol, LDL, is high at 172 triglycerides good at 62. Prostate normal with a PSA of 0.4, the rest of the labs are completely normal.  I recommend trying a low-dose rosuvastatin 5 mg 3 times a week, Monday Wednesday Friday.  Recheck CMP lipids in 3 months.  Cut back on fried food and fast food.

## 2022-08-16 ENCOUNTER — TELEPHONE (OUTPATIENT)
Dept: FAMILY MEDICINE | Facility: CLINIC | Age: 67
End: 2022-08-16

## 2022-08-16 NOTE — TELEPHONE ENCOUNTER
----- Message from [facility], RT sent at 5/9/2022  3:56 PM CDT -----  Regarding: Lab due  Pritesh Casillas MD   5/8/2022  3:58 PM CDT Back to Top      Call patient.  Cholesterol moderately elevated 241. Bad cholesterol, LDL, is high at 172 triglycerides good at 62. Prostate normal with a PSA of 0.4, the rest of the labs are completely normal.  I recommend trying a low-dose rosuvastatin 5 mg 3 times a week, Monday Wednesday Friday.  Recheck CMP lipids in 3 months.  Cut back on fried food and fast food.

## 2022-08-17 ENCOUNTER — TELEPHONE (OUTPATIENT)
Dept: FAMILY MEDICINE | Facility: CLINIC | Age: 67
End: 2022-08-17

## 2022-08-17 NOTE — TELEPHONE ENCOUNTER
----- Message from Angela Shell sent at 8/17/2022 10:36 AM CDT -----  Patient called and stated that he as started taking ezetimibe and it is causing his back to hurt please give him a call back at [phone]

## 2022-08-30 ENCOUNTER — TELEPHONE (OUTPATIENT)
Dept: FAMILY MEDICINE | Facility: CLINIC | Age: 67
End: 2022-08-30

## 2022-08-30 NOTE — TELEPHONE ENCOUNTER
Left message that fasting lab is due to recheck cholesterol. This makes 3 attempts. Can I derik reminder complete?

## 2022-08-30 NOTE — LETTER
[address]  [place] [address]  Phone: [phone]   [phone]                        MD Poonam Suarez, MD Jitendar Reyna PA-C Linda Melerine, IZABELLA Murphy, IZABELLA Pettit PA-C      Date: 08/31/2022        Patient: Teodoro Sosa  YOB: 1955      Dear Mr Valerio,    We have tried to reach you by telephone to remind you fasting lab is due to recheck cholesterol. Please have drawn soon. Let us know if you have any questions. Thank you.      Sincerely,   Dr Casillas/kiran

## 2022-08-30 NOTE — TELEPHONE ENCOUNTER
----- Message from [facility], RT sent at 5/9/2022  3:56 PM CDT -----  Regarding: Lab due  Pritesh Casillas MD   5/8/2022  3:58 PM CDT Back to Top      Call patient.  Cholesterol moderately elevated 241. Bad cholesterol, LDL, is high at 172 triglycerides good at 62. Prostate normal with a PSA of 0.4, the rest of the labs are completely normal.  I recommend trying a low-dose rosuvastatin 5 mg 3 times a week, Monday Wednesday Friday.  Recheck CMP lipids in 3 months.  Cut back on fried food and fast food.

## 2022-09-01 ENCOUNTER — TELEPHONE (OUTPATIENT)
Dept: FAMILY MEDICINE | Facility: CLINIC | Age: 67
End: 2022-09-01

## 2022-09-01 NOTE — TELEPHONE ENCOUNTER
Spoke to patient that fasting lab is due to recheck cholesterol, orders at [facility], encouraged water. Said he will come tomorrow.

## 2022-09-01 NOTE — TELEPHONE ENCOUNTER
----- Message from Alexandra Lozada sent at 9/1/2022 12:00 PM CDT -----  Please call and schedule and appointment to get patient's cholesterol checked. Patient's callback number is [phone].

## 2022-09-03 LAB
ALBUMIN SERPL-MCNC: 4.2 G/DL (ref 3.6–5.1)
ALBUMIN/GLOB SERPL: 1.8 (CALC) (ref 1–2.5)
ALP SERPL-CCNC: 74 U/L (ref 35–144)
ALT SERPL-CCNC: 18 U/L (ref 9–46)
AST SERPL-CCNC: 13 U/L (ref 10–35)
BILIRUB SERPL-MCNC: 0.5 MG/DL (ref 0.2–1.2)
BUN SERPL-MCNC: 17 MG/DL (ref 7–25)
BUN/CREAT SERPL: NORMAL (CALC) (ref 6–22)
CALCIUM SERPL-MCNC: 9.5 MG/DL (ref 8.6–10.3)
CHLORIDE SERPL-SCNC: 104 MMOL/L (ref 98–110)
CHOLEST SERPL-MCNC: 236 MG/DL
CHOLEST/HDLC SERPL: 3.9 (CALC)
CO2 SERPL-SCNC: 27 MMOL/L (ref 20–32)
CREAT SERPL-MCNC: 0.82 MG/DL (ref 0.7–1.35)
EGFR: 97 ML/MIN/1.73M2
GLOBULIN SER CALC-MCNC: 2.4 G/DL (CALC) (ref 1.9–3.7)
GLUCOSE SERPL-MCNC: 95 MG/DL (ref 65–99)
HDLC SERPL-MCNC: 60 MG/DL
LDLC SERPL CALC-MCNC: 161 MG/DL (CALC)
NONHDLC SERPL-MCNC: 176 MG/DL (CALC)
POTASSIUM SERPL-SCNC: 4.6 MMOL/L (ref 3.5–5.3)
PROT SERPL-MCNC: 6.6 G/DL (ref 6.1–8.1)
SODIUM SERPL-SCNC: 137 MMOL/L (ref 135–146)
TRIGL SERPL-MCNC: 48 MG/DL

## 2022-09-05 NOTE — PROGRESS NOTES
Call patient.  Sugar kidneys and liver look good.  Cholesterol improved from 241 down to 236 triglycerides 48 continue low-fat diet

## 2022-09-06 ENCOUNTER — TELEPHONE (OUTPATIENT)
Dept: FAMILY MEDICINE | Facility: CLINIC | Age: 67
End: 2022-09-06

## 2022-09-06 NOTE — TELEPHONE ENCOUNTER
----- Message from Pritesh Casillas MD sent at 9/5/2022 11:42 AM CDT -----  Call patient.  Sugar kidneys and liver look good.  Cholesterol improved from 241 down to 236 triglycerides 48 continue low-fat diet

## 2022-09-06 NOTE — TELEPHONE ENCOUNTER
I called patient with lab results and he inquired about this message. I informed him that Nighat tried calling him back.

## 2022-09-07 NOTE — TELEPHONE ENCOUNTER
Spoke to patient with information per Dr Casillas. Verbalized understanding. Wants to know if he is to take 2 or 4 Lovaza

## 2022-09-07 NOTE — TELEPHONE ENCOUNTER
Patient said that he is on 2 pills, but previously with his other doctor was on 4. The patient put himself on 2 pills and told Dr Casillas that is what he was taking when he came in. Said he was running out so he started taking 2 instead of 4.

## 2022-09-07 NOTE — TELEPHONE ENCOUNTER
I don't see where Dr. Casillas mentioned changing this - should still be 2 capsules - is he thinking it was changed?

## 2022-11-22 ENCOUNTER — TELEPHONE (OUTPATIENT)
Dept: FAMILY MEDICINE | Facility: CLINIC | Age: 67
End: 2022-11-22

## 2022-11-22 DIAGNOSIS — E78.2 MIXED HYPERLIPIDEMIA: ICD-10-CM

## 2022-11-22 DIAGNOSIS — Z79.899 ENCOUNTER FOR LONG-TERM (CURRENT) USE OF OTHER MEDICATIONS: Primary | ICD-10-CM

## 2022-11-22 NOTE — TELEPHONE ENCOUNTER
Spoke with wife (Paula) regarding husbands omega 3 (Lovaza) and wife stated  takes 2 pills per day but thought  is supposed to take 4 pills. Looking at notes, Dr. Casillas rx'd 2 pills for now not 4 pills. Informed wife of this. Last labs done in Sep. Will need new blood work orders. Remind me created. Lipid panel, CMP

## 2022-12-07 ENCOUNTER — TELEPHONE (OUTPATIENT)
Dept: FAMILY MEDICINE | Facility: CLINIC | Age: 67
End: 2022-12-07

## 2022-12-07 NOTE — TELEPHONE ENCOUNTER
----- Message from Samara Kinsey LPN sent at 11/22/2022 12:26 PM CST -----  Regarding: [facility] lipid panel  Spoke with wife (Paula) regarding husbands omega 3 (Lovaza) and wife stated  takes 2 pills per day but thought  is supposed to take 4 pills. Looking at notes, Dr. Casillas rx'd 2 pills for now not 4 pills. Informed wife of this. Last labs done in Sep. Will need new blood work orders. Remind me created.     Lipid panel, [facility] 11/22/2022

## 2022-12-13 LAB
ALBUMIN SERPL-MCNC: 4.3 G/DL (ref 3.6–5.1)
ALBUMIN/GLOB SERPL: 1.7 (CALC) (ref 1–2.5)
ALP SERPL-CCNC: 68 U/L (ref 35–144)
ALT SERPL-CCNC: 17 U/L (ref 9–46)
AST SERPL-CCNC: 15 U/L (ref 10–35)
BILIRUB SERPL-MCNC: 0.5 MG/DL (ref 0.2–1.2)
BUN SERPL-MCNC: 15 MG/DL (ref 7–25)
BUN/CREAT SERPL: NORMAL (CALC) (ref 6–22)
CALCIUM SERPL-MCNC: 9.5 MG/DL (ref 8.6–10.3)
CHLORIDE SERPL-SCNC: 104 MMOL/L (ref 98–110)
CHOLEST SERPL-MCNC: 239 MG/DL
CHOLEST/HDLC SERPL: 4.4 (CALC)
CO2 SERPL-SCNC: 27 MMOL/L (ref 20–32)
CREAT SERPL-MCNC: 0.84 MG/DL (ref 0.7–1.35)
EGFR: 96 ML/MIN/1.73M2
GLOBULIN SER CALC-MCNC: 2.5 G/DL (CALC) (ref 1.9–3.7)
GLUCOSE SERPL-MCNC: 93 MG/DL (ref 65–99)
HDLC SERPL-MCNC: 54 MG/DL
LDLC SERPL CALC-MCNC: 170 MG/DL (CALC)
NONHDLC SERPL-MCNC: 185 MG/DL (CALC)
POTASSIUM SERPL-SCNC: 4.6 MMOL/L (ref 3.5–5.3)
PROT SERPL-MCNC: 6.8 G/DL (ref 6.1–8.1)
SODIUM SERPL-SCNC: 138 MMOL/L (ref 135–146)
TRIGL SERPL-MCNC: 53 MG/DL

## 2022-12-18 NOTE — PROGRESS NOTES
Call patient.  Sugar kidneys and liver look fine.  Cholesterol is rising a bit up to 239.  LDL bad cholesterol is high at 170.  Recommend adding rosuvastatin 5 mg q.p.m. to correct cholesterol problem.  Repeat CMP lipids in 3-4 months after starting rosuvastatin

## 2022-12-19 ENCOUNTER — TELEPHONE (OUTPATIENT)
Dept: FAMILY MEDICINE | Facility: CLINIC | Age: 67
End: 2022-12-19

## 2022-12-19 DIAGNOSIS — Z79.899 ENCOUNTER FOR LONG-TERM (CURRENT) USE OF OTHER MEDICATIONS: ICD-10-CM

## 2022-12-19 DIAGNOSIS — E78.2 MIXED HYPERLIPIDEMIA: Primary | ICD-10-CM

## 2022-12-19 RX ORDER — ROSUVASTATIN CALCIUM 5 MG/1
5 TABLET, COATED ORAL DAILY
Qty: 90 TABLET | Refills: 1 | Status: SHIPPED | OUTPATIENT
Start: 2022-12-19 | End: 2023-03-30 | Stop reason: SDUPTHER

## 2022-12-19 RX ORDER — OMEGA-3-ACID ETHYL ESTERS 1 G/1
2 CAPSULE, LIQUID FILLED ORAL 2 TIMES DAILY
Qty: 360 CAPSULE | Refills: 1 | Status: SHIPPED | OUTPATIENT
Start: 2022-12-19 | End: 2023-03-30 | Stop reason: SDUPTHER

## 2022-12-19 NOTE — TELEPHONE ENCOUNTER
Spoke to patient with results verbatim per Dr Casillas. Said that the pharmacy is only filling his Lovaza at 1 twice a day, sig in chart says 2 BID. Needs refill on that and wants to clarify that this is how he's supposed to be taking it. Said he is not on Zetia because it was causing back pain. I marked this as 'not taking' on med list. Agrees to try Rosuvastatin. Allergies and pharmacy verified.

## 2022-12-19 NOTE — TELEPHONE ENCOUNTER
----- Message from Pritesh Casillas MD sent at 12/17/2022  8:06 PM CST -----  Call patient.  Sugar kidneys and liver look fine.  Cholesterol is rising a bit up to 239.  LDL bad cholesterol is high at 170.  Recommend adding rosuvastatin 5 mg q.p.m. to correct cholesterol problem.  Repeat CMP lipids in 3-4 months after starting rosuvastatin

## 2023-03-21 ENCOUNTER — TELEPHONE (OUTPATIENT)
Dept: FAMILY MEDICINE | Facility: CLINIC | Age: 68
End: 2023-03-21

## 2023-03-21 NOTE — TELEPHONE ENCOUNTER
----- Message from [facility]  sent at 12/19/2022  3:19 PM CST -----  Regarding: Lab due  ---- Message from Pritesh Casillas MD sent at 12/17/2022  8:06 PM CST -----  Call patient.  Sugar kidneys and liver look fine.  Cholesterol is rising a bit up to 239.  LDL bad cholesterol is high at 170.  Recommend adding rosuvastatin 5 mg q.p.m. to correct cholesterol problem.  Repeat CMP lipids in 3-4 months after starting rosuvastatin

## 2023-03-24 ENCOUNTER — TELEPHONE (OUTPATIENT)
Dept: FAMILY MEDICINE | Facility: CLINIC | Age: 68
End: 2023-03-24

## 2023-03-24 NOTE — TELEPHONE ENCOUNTER
LMOR for patient to call back, received a form that he needs a surgery clearance. Patient has only been seen here once a year ago - will need an appointment. Action created for next week to try calling again.

## 2023-03-25 LAB
ALBUMIN SERPL-MCNC: 4.2 G/DL (ref 3.6–5.1)
ALBUMIN/GLOB SERPL: 1.7 (CALC) (ref 1–2.5)
ALP SERPL-CCNC: 68 U/L (ref 35–144)
ALT SERPL-CCNC: 24 U/L (ref 9–46)
AST SERPL-CCNC: 14 U/L (ref 10–35)
BILIRUB SERPL-MCNC: 0.7 MG/DL (ref 0.2–1.2)
BUN SERPL-MCNC: 23 MG/DL (ref 7–25)
BUN/CREAT SERPL: NORMAL (CALC) (ref 6–22)
CALCIUM SERPL-MCNC: 9.5 MG/DL (ref 8.6–10.3)
CHLORIDE SERPL-SCNC: 103 MMOL/L (ref 98–110)
CHOLEST SERPL-MCNC: 180 MG/DL
CHOLEST/HDLC SERPL: 3.2 (CALC)
CO2 SERPL-SCNC: 26 MMOL/L (ref 20–32)
CREAT SERPL-MCNC: 0.84 MG/DL (ref 0.7–1.35)
EGFR: 96 ML/MIN/1.73M2
GLOBULIN SER CALC-MCNC: 2.5 G/DL (CALC) (ref 1.9–3.7)
GLUCOSE SERPL-MCNC: 96 MG/DL (ref 65–99)
HDLC SERPL-MCNC: 56 MG/DL
LDLC SERPL CALC-MCNC: 110 MG/DL (CALC)
NONHDLC SERPL-MCNC: 124 MG/DL (CALC)
POTASSIUM SERPL-SCNC: 4.6 MMOL/L (ref 3.5–5.3)
PROT SERPL-MCNC: 6.7 G/DL (ref 6.1–8.1)
SODIUM SERPL-SCNC: 137 MMOL/L (ref 135–146)
TRIGL SERPL-MCNC: 58 MG/DL

## 2023-03-30 ENCOUNTER — TELEPHONE (OUTPATIENT)
Dept: FAMILY MEDICINE | Facility: CLINIC | Age: 68
End: 2023-03-30

## 2023-03-30 ENCOUNTER — OFFICE VISIT (OUTPATIENT)
Dept: FAMILY MEDICINE | Facility: CLINIC | Age: 68
End: 2023-03-30
Payer: MEDICARE

## 2023-03-30 VITALS
BODY MASS INDEX: 27.58 KG/M2 | HEIGHT: 68 IN | OXYGEN SATURATION: 99 % | HEART RATE: 65 BPM | DIASTOLIC BLOOD PRESSURE: 84 MMHG | SYSTOLIC BLOOD PRESSURE: 130 MMHG | WEIGHT: 182 LBS

## 2023-03-30 DIAGNOSIS — M17.12 PRIMARY OSTEOARTHRITIS OF LEFT KNEE: ICD-10-CM

## 2023-03-30 DIAGNOSIS — Z01.818 PREOPERATIVE CLEARANCE: Primary | ICD-10-CM

## 2023-03-30 DIAGNOSIS — E78.2 MIXED HYPERLIPIDEMIA: ICD-10-CM

## 2023-03-30 DIAGNOSIS — Z23 NEED FOR VACCINATION AGAINST STREPTOCOCCUS PNEUMONIAE: ICD-10-CM

## 2023-03-30 PROCEDURE — 99214 OFFICE O/P EST MOD 30 MIN: CPT | Mod: S$GLB,,, | Performed by: NURSE PRACTITIONER

## 2023-03-30 PROCEDURE — 3288F PR FALLS RISK ASSESSMENT DOCUMENTED: ICD-10-PCS | Mod: CPTII,S$GLB,, | Performed by: NURSE PRACTITIONER

## 2023-03-30 PROCEDURE — 99214 PR OFFICE/OUTPT VISIT, EST, LEVL IV, 30-39 MIN: ICD-10-PCS | Mod: S$GLB,,, | Performed by: NURSE PRACTITIONER

## 2023-03-30 PROCEDURE — 1101F PR PT FALLS ASSESS DOC 0-1 FALLS W/OUT INJ PAST YR: ICD-10-PCS | Mod: CPTII,S$GLB,, | Performed by: NURSE PRACTITIONER

## 2023-03-30 PROCEDURE — 3075F PR MOST RECENT SYSTOLIC BLOOD PRESS GE 130-139MM HG: ICD-10-PCS | Mod: CPTII,S$GLB,, | Performed by: NURSE PRACTITIONER

## 2023-03-30 PROCEDURE — 3075F SYST BP GE 130 - 139MM HG: CPT | Mod: CPTII,S$GLB,, | Performed by: NURSE PRACTITIONER

## 2023-03-30 PROCEDURE — 3008F PR BODY MASS INDEX (BMI) DOCUMENTED: ICD-10-PCS | Mod: CPTII,S$GLB,, | Performed by: NURSE PRACTITIONER

## 2023-03-30 PROCEDURE — 1159F PR MEDICATION LIST DOCUMENTED IN MEDICAL RECORD: ICD-10-PCS | Mod: CPTII,S$GLB,, | Performed by: NURSE PRACTITIONER

## 2023-03-30 PROCEDURE — G0009 ADMIN PNEUMOCOCCAL VACCINE: HCPCS | Mod: S$GLB,,, | Performed by: NURSE PRACTITIONER

## 2023-03-30 PROCEDURE — 3008F BODY MASS INDEX DOCD: CPT | Mod: CPTII,S$GLB,, | Performed by: NURSE PRACTITIONER

## 2023-03-30 PROCEDURE — 1159F MED LIST DOCD IN RCRD: CPT | Mod: CPTII,S$GLB,, | Performed by: NURSE PRACTITIONER

## 2023-03-30 PROCEDURE — 1160F PR REVIEW ALL MEDS BY PRESCRIBER/CLIN PHARMACIST DOCUMENTED: ICD-10-PCS | Mod: CPTII,S$GLB,, | Performed by: NURSE PRACTITIONER

## 2023-03-30 PROCEDURE — 90677 PCV20 VACCINE IM: CPT | Mod: S$GLB,,, | Performed by: NURSE PRACTITIONER

## 2023-03-30 PROCEDURE — 1101F PT FALLS ASSESS-DOCD LE1/YR: CPT | Mod: CPTII,S$GLB,, | Performed by: NURSE PRACTITIONER

## 2023-03-30 PROCEDURE — 90677 PNEUMOCOCCAL CONJUGATE VACCINE 20-VALENT: ICD-10-PCS | Mod: S$GLB,,, | Performed by: NURSE PRACTITIONER

## 2023-03-30 PROCEDURE — 1160F RVW MEDS BY RX/DR IN RCRD: CPT | Mod: CPTII,S$GLB,, | Performed by: NURSE PRACTITIONER

## 2023-03-30 PROCEDURE — 3079F PR MOST RECENT DIASTOLIC BLOOD PRESSURE 80-89 MM HG: ICD-10-PCS | Mod: CPTII,S$GLB,, | Performed by: NURSE PRACTITIONER

## 2023-03-30 PROCEDURE — G0009 PNEUMOCOCCAL CONJUGATE VACCINE 20-VALENT: ICD-10-PCS | Mod: S$GLB,,, | Performed by: NURSE PRACTITIONER

## 2023-03-30 PROCEDURE — 3079F DIAST BP 80-89 MM HG: CPT | Mod: CPTII,S$GLB,, | Performed by: NURSE PRACTITIONER

## 2023-03-30 PROCEDURE — 3288F FALL RISK ASSESSMENT DOCD: CPT | Mod: CPTII,S$GLB,, | Performed by: NURSE PRACTITIONER

## 2023-03-30 RX ORDER — ROSUVASTATIN CALCIUM 5 MG/1
5 TABLET, COATED ORAL DAILY
Qty: 90 TABLET | Refills: 3 | Status: SHIPPED | OUTPATIENT
Start: 2023-03-30 | End: 2024-04-03 | Stop reason: SDUPTHER

## 2023-03-30 RX ORDER — OMEGA-3-ACID ETHYL ESTERS 1 G/1
2 CAPSULE, LIQUID FILLED ORAL 2 TIMES DAILY
Qty: 360 CAPSULE | Refills: 3 | Status: SHIPPED | OUTPATIENT
Start: 2023-03-30

## 2023-03-30 NOTE — PROGRESS NOTES
SUBJECTIVE:    Patient ID: Teodoro Sosa is a 67 y.o. male.    Chief Complaint: surgical clearance (No bottles//pt is here for surgical clearance for a knee replacement on his left knee// pt would like his pna vacc//fred)    Pt here for preop clearance- left TKR with Dr. Lazo at [facility] on 4/12/202- spinal anesthesia  Pt denies any previous issues with anesthesia. No hx of CAD and denies CP, SOB or palpitations.  Had preop labs and EKG performed at [facility] and reviewed in Media    Started on statin in December, states tolerating med well      Telephone on 12/19/2022   Component Date Value Ref Range Status    Glucose 03/24/2023 96  65 - 99 mg/dL Final    BUN 03/24/2023 23  7 - 25 mg/dL Final    Creatinine 03/24/2023 0.84  0.70 - 1.35 mg/dL Final    eGFR 03/24/2023 96  > OR = 60 mL/min/1.73m2 Final    BUN/Creatinine Ratio 03/24/2023 NOT APPLICABLE  6 - 22 (calc) Final    Sodium 03/24/2023 137  135 - 146 mmol/L Final    Potassium 03/24/2023 4.6  3.5 - 5.3 mmol/L Final    Chloride 03/24/2023 103  98 - 110 mmol/L Final    CO2 03/24/2023 26  20 - 32 mmol/L Final    Calcium 03/24/2023 9.5  8.6 - 10.3 mg/dL Final    Total Protein 03/24/2023 6.7  6.1 - 8.1 g/dL Final    Albumin 03/24/2023 4.2  3.6 - 5.1 g/dL Final    Globulin, Total 03/24/2023 2.5  1.9 - 3.7 g/dL (calc) Final    Albumin/Globulin Ratio 03/24/2023 1.7  1.0 - 2.5 (calc) Final    Total Bilirubin 03/24/2023 0.7  0.2 - 1.2 mg/dL Final    Alkaline Phosphatase 03/24/2023 68  35 - 144 U/L Final    AST 03/24/2023 14  10 - 35 U/L Final    ALT 03/24/2023 24  9 - 46 U/L Final    Cholesterol 03/24/2023 180  <200 mg/dL Final    HDL 03/24/2023 56  > OR = 40 mg/dL Final    Triglycerides 03/24/2023 58  <150 mg/dL Final    LDL Cholesterol 03/24/2023 110 (H)  mg/dL (calc) Final    HDL/Cholesterol Ratio 03/24/2023 3.2  <5.0 (calc) Final    Non HDL Chol. (LDL+VLDL) 03/24/2023 124  <130 mg/dL (calc) Final   Telephone on 11/22/2022   Component Date Value Ref Range Status     Glucose 12/12/2022 93  65 - 99 mg/dL Final    BUN 12/12/2022 15  7 - 25 mg/dL Final    Creatinine 12/12/2022 0.84  0.70 - 1.35 mg/dL Final    eGFR 12/12/2022 96  > OR = 60 mL/min/1.73m2 Final    BUN/Creatinine Ratio 12/12/2022 NOT APPLICABLE  6 - 22 (calc) Final    Sodium 12/12/2022 138  135 - 146 mmol/L Final    Potassium 12/12/2022 4.6  3.5 - 5.3 mmol/L Final    Chloride 12/12/2022 104  98 - 110 mmol/L Final    CO2 12/12/2022 27  20 - 32 mmol/L Final    Calcium 12/12/2022 9.5  8.6 - 10.3 mg/dL Final    Total Protein 12/12/2022 6.8  6.1 - 8.1 g/dL Final    Albumin 12/12/2022 4.3  3.6 - 5.1 g/dL Final    Globulin, Total 12/12/2022 2.5  1.9 - 3.7 g/dL (calc) Final    Albumin/Globulin Ratio 12/12/2022 1.7  1.0 - 2.5 (calc) Final    Total Bilirubin 12/12/2022 0.5  0.2 - 1.2 mg/dL Final    Alkaline Phosphatase 12/12/2022 68  35 - 144 U/L Final    AST 12/12/2022 15  10 - 35 U/L Final    ALT 12/12/2022 17  9 - 46 U/L Final    Cholesterol 12/12/2022 239 (H)  <200 mg/dL Final    HDL 12/12/2022 54  > OR = 40 mg/dL Final    Triglycerides 12/12/2022 53  <150 mg/dL Final    LDL Cholesterol 12/12/2022 170 (H)  mg/dL (calc) Final    HDL/Cholesterol Ratio 12/12/2022 4.4  <5.0 (calc) Final    Non HDL Chol. (LDL+VLDL) 12/12/2022 185 (H)  <130 mg/dL (calc) Final       Past Medical History:   Diagnosis Date    Fractures 1982    right leg -femur-    Primary osteoarthritis of left knee 4/28/2022     Past Surgical History:   Procedure Laterality Date    KNEE ARTHROSCOPY W/ MENISCECTOMY Left 11/24/2021    Procedure: ARTHROSCOPY, KNEE, WITH MENISCECTOMY;  Surgeon: Tobias Rich MD;  Location: [facility];  Service: Orthopedics;  Laterality: Left;  notified Arthrex 11/18, wm    TONSILLECTOMY  1963    UMBILICAL HERNIA REPAIR      x2 umbilical left inguinal     History reviewed. No pertinent family history.    The 10-year CVD risk score (D'Agostino, et al., 2008) is: 15.8%    Values used to calculate the score:      Age: 67 years      Sex: Male    "   Diabetic: No      Tobacco smoker: No      Systolic Blood Pressure: 130 mmHg      Is BP treated: No      HDL Cholesterol: 56 mg/dL      Total Cholesterol: 180 mg/dL     Marital Status:   Alcohol History:  reports current alcohol use.  Tobacco History:  reports that he has never smoked. He has never been exposed to tobacco smoke. He has never used smokeless tobacco.  Drug History:  reports no history of drug use.    Health Maintenance Topics with due status: Not Due       Topic Last Completion Date    Colorectal Cancer Screening 08/11/2016    Hemoglobin A1c (Diabetic Prevention Screening) 12/29/2020    Lipid Panel 03/24/2023     Immunization History   Administered Date(s) Administered    Pneumococcal Conjugate - 20 Valent 03/30/2023       Review of patient's allergies indicates:  No Known Allergies    Current Outpatient Medications:     omega-3 acid ethyl esters (LOVAZA) 1 gram capsule, Take 2 capsules (2 g total) by mouth 2 (two) times daily., Disp: 360 capsule, Rfl: 3    oxyCODONE-acetaminophen (PERCOCET) 7.5-325 mg per tablet, Take 1 tablet by mouth every 4 (four) hours as needed for Pain. (Patient not taking: No sig reported), Disp: 28 tablet, Rfl: 0    rosuvastatin (CRESTOR) 5 MG tablet, Take 1 tablet (5 mg total) by mouth once daily. For cholesterol, Disp: 90 tablet, Rfl: 3    Review of Systems   Constitutional:  Negative for chills and fever.   Respiratory:  Negative for cough, shortness of breath and wheezing.    Cardiovascular:  Negative for chest pain, palpitations and leg swelling.   Gastrointestinal:  Negative for abdominal pain, constipation and diarrhea.   Genitourinary:  Negative for dysuria and hematuria.   Musculoskeletal:  Positive for arthralgias (knee pain).   Skin:  Negative for rash.   Neurological:  Negative for dizziness, numbness and headaches.        Objective:      Vitals:    03/30/23 0907   BP: 130/84   Pulse: 65   SpO2: 99%   Weight: 82.6 kg (182 lb)   Height: 5' 8" (1.727 m) "     Physical Exam  Vitals reviewed.   Constitutional:       General: He is not in acute distress.     Appearance: Normal appearance. He is well-developed.   HENT:      Head: Normocephalic and atraumatic.      Right Ear: Tympanic membrane and ear canal normal.      Left Ear: Tympanic membrane and ear canal normal.   Neck:      Vascular: No carotid bruit.   Cardiovascular:      Rate and Rhythm: Normal rate and regular rhythm.      Heart sounds: No murmur heard.  Pulmonary:      Effort: Pulmonary effort is normal. No respiratory distress.      Breath sounds: Normal breath sounds. No wheezing or rales.   Abdominal:      General: There is no distension.      Palpations: Abdomen is soft.      Tenderness: There is no abdominal tenderness.   Musculoskeletal:      Cervical back: Neck supple.      Left knee: Crepitus present. No effusion.      Right lower leg: No edema.      Left lower leg: No edema.   Lymphadenopathy:      Cervical: No cervical adenopathy.   Skin:     General: Skin is warm and dry.      Findings: No rash.   Neurological:      General: No focal deficit present.      Mental Status: He is alert and oriented to person, place, and time.      Gait: Gait normal.   Psychiatric:         Mood and Affect: Mood normal.         Assessment:       1. Preoperative clearance    2. Primary osteoarthritis of left knee    3. Mixed hyperlipidemia    4. Need for vaccination against Streptococcus pneumoniae           Plan:       1. Preoperative clearance   -reviewed preop labs and EKG- pt cleared for surgery    2. Primary osteoarthritis of left knee    3. Mixed hyperlipidemia  -reviewed recent labs with pt- much improved on statin, continue as is  -     rosuvastatin (CRESTOR) 5 MG tablet; Take 1 tablet (5 mg total) by mouth once daily. For cholesterol  Dispense: 90 tablet; Refill: 3  -     omega-3 acid ethyl esters (LOVAZA) 1 gram capsule; Take 2 capsules (2 g total) by mouth 2 (two) times daily.  Dispense: 360 capsule; Refill:  3    4. Need for vaccination against Streptococcus pneumoniae  -     Pneumococcal Conjugate Vaccine (20 Valent) (IM)      Follow up in about 6 months (around 9/30/2023) for lipids.          Counseled on age and gender appropriate medical preventative services, including cancer screenings, immunizations, overall nutritional health, need for a consistent exercise regimen and an overall push towards maintaining a vigorous and active lifestyle.      3/30/2023 Shanti Guzman NP

## 2023-03-30 NOTE — LETTER
[address]  [place] [address]  Phone: [phone]   [phone]                        MD Poonam Suarez, MD Jitendra Reyna, PABELINDA Johnson, IZABELLA Sousa, IZABELLA Pettit PA-C      Date: 03/30/2023        Patient: Teodoro Sosa  YOB: 1955      Please fax over pt pre-op labs and EKG.        Sincerely,     Sara [place] MA    Electronically Signed By: Shanti Guzman NP

## 2023-03-30 NOTE — TELEPHONE ENCOUNTER
----- Message from Shanti Guzman NP sent at 3/30/2023  9:30 AM CDT -----  Please request preop labs and EKG from [facility]

## 2023-04-01 NOTE — PROGRESS NOTES
Call patient.  Cholesterol has improved down to 180 triglycerides 58.  Sugar kidneys and liver all look normal.  Continue current medications

## 2023-04-03 ENCOUNTER — TELEPHONE (OUTPATIENT)
Dept: FAMILY MEDICINE | Facility: CLINIC | Age: 68
End: 2023-04-03

## 2023-04-03 NOTE — TELEPHONE ENCOUNTER
----- Message from Pritesh Casillas MD sent at 4/1/2023 10:57 AM CDT -----  Call patient.  Cholesterol has improved down to 180 triglycerides 58.  Sugar kidneys and liver all look normal.  Continue current medications

## 2023-04-03 NOTE — TELEPHONE ENCOUNTER
Spoke with pt in regards to recent lab. Verbalized per Dr. Casillas that pt's cholesterol has improved down to 180 triglycerides 58. Sugar kidneys and liver all look normal.  Continue current medications. Pt acknowledge understanding.

## 2023-09-13 ENCOUNTER — TELEPHONE (OUTPATIENT)
Dept: FAMILY MEDICINE | Facility: CLINIC | Age: 68
End: 2023-09-13

## 2023-09-13 DIAGNOSIS — E78.2 MIXED HYPERLIPIDEMIA: Primary | ICD-10-CM

## 2023-09-13 DIAGNOSIS — Z79.899 ENCOUNTER FOR LONG-TERM (CURRENT) USE OF OTHER MEDICATIONS: ICD-10-CM

## 2023-09-13 DIAGNOSIS — Z00.00 ROUTINE MEDICAL EXAM: ICD-10-CM

## 2023-09-13 DIAGNOSIS — Z12.5 SPECIAL SCREENING FOR MALIGNANT NEOPLASM OF PROSTATE: ICD-10-CM

## 2023-09-13 NOTE — TELEPHONE ENCOUNTER
Spoke with pt in regards to pre-visit labs. Verbalized that we have placed lab order for you to have done before your upcoming appointment on 09/26/2023. Verbalized that the lab order are placed through [facility], so they can come into the office anytime, no appointment necessary. Just make sure that you are fasting for you labs. Pt acknowledge understanding.

## 2023-09-16 LAB
ALBUMIN SERPL-MCNC: 4.4 G/DL (ref 3.6–5.1)
ALBUMIN/GLOB SERPL: 1.8 (CALC) (ref 1–2.5)
ALP SERPL-CCNC: 59 U/L (ref 35–144)
ALT SERPL-CCNC: 23 U/L (ref 9–46)
APPEARANCE UR: CLEAR
AST SERPL-CCNC: 16 U/L (ref 10–35)
BACTERIA #/AREA URNS HPF: NORMAL /HPF
BACTERIA UR CULT: NORMAL
BASOPHILS # BLD AUTO: 41 CELLS/UL (ref 0–200)
BASOPHILS NFR BLD AUTO: 0.9 %
BILIRUB SERPL-MCNC: 0.5 MG/DL (ref 0.2–1.2)
BILIRUB UR QL STRIP: NEGATIVE
BUN SERPL-MCNC: 20 MG/DL (ref 7–25)
BUN/CREAT SERPL: NORMAL (CALC) (ref 6–22)
CALCIUM SERPL-MCNC: 9.5 MG/DL (ref 8.6–10.3)
CHLORIDE SERPL-SCNC: 104 MMOL/L (ref 98–110)
CHOLEST SERPL-MCNC: 179 MG/DL
CHOLEST/HDLC SERPL: 2.7 (CALC)
CO2 SERPL-SCNC: 27 MMOL/L (ref 20–32)
COLOR UR: YELLOW
CREAT SERPL-MCNC: 0.88 MG/DL (ref 0.7–1.35)
EGFR: 94 ML/MIN/1.73M2
EOSINOPHIL # BLD AUTO: 129 CELLS/UL (ref 15–500)
EOSINOPHIL NFR BLD AUTO: 2.8 %
ERYTHROCYTE [DISTWIDTH] IN BLOOD BY AUTOMATED COUNT: 13.3 % (ref 11–15)
GLOBULIN SER CALC-MCNC: 2.5 G/DL (CALC) (ref 1.9–3.7)
GLUCOSE SERPL-MCNC: 96 MG/DL (ref 65–99)
GLUCOSE UR QL STRIP: NEGATIVE
HCT VFR BLD AUTO: 45.8 % (ref 38.5–50)
HDLC SERPL-MCNC: 67 MG/DL
HGB BLD-MCNC: 14.9 G/DL (ref 13.2–17.1)
HGB UR QL STRIP: NEGATIVE
HYALINE CASTS #/AREA URNS LPF: NORMAL /LPF
KETONES UR QL STRIP: NEGATIVE
LDLC SERPL CALC-MCNC: 99 MG/DL (CALC)
LEUKOCYTE ESTERASE UR QL STRIP: NEGATIVE
LYMPHOCYTES # BLD AUTO: 1569 CELLS/UL (ref 850–3900)
LYMPHOCYTES NFR BLD AUTO: 34.1 %
MCH RBC QN AUTO: 29.6 PG (ref 27–33)
MCHC RBC AUTO-ENTMCNC: 32.5 G/DL (ref 32–36)
MCV RBC AUTO: 91.1 FL (ref 80–100)
MONOCYTES # BLD AUTO: 474 CELLS/UL (ref 200–950)
MONOCYTES NFR BLD AUTO: 10.3 %
NEUTROPHILS # BLD AUTO: 2387 CELLS/UL (ref 1500–7800)
NEUTROPHILS NFR BLD AUTO: 51.9 %
NITRITE UR QL STRIP: NEGATIVE
NONHDLC SERPL-MCNC: 112 MG/DL (CALC)
PH UR STRIP: 6 [PH] (ref 5–8)
PLATELET # BLD AUTO: 170 THOUSAND/UL (ref 140–400)
PMV BLD REES-ECKER: 10.6 FL (ref 7.5–12.5)
POTASSIUM SERPL-SCNC: 4.7 MMOL/L (ref 3.5–5.3)
PROT SERPL-MCNC: 6.9 G/DL (ref 6.1–8.1)
PROT UR QL STRIP: NEGATIVE
PSA SERPL-MCNC: 0.38 NG/ML
RBC # BLD AUTO: 5.03 MILLION/UL (ref 4.2–5.8)
RBC #/AREA URNS HPF: NORMAL /HPF
SERVICE CMNT-IMP: NORMAL
SODIUM SERPL-SCNC: 139 MMOL/L (ref 135–146)
SP GR UR STRIP: 1.02 (ref 1–1.03)
SQUAMOUS #/AREA URNS HPF: NORMAL /HPF
TRIGL SERPL-MCNC: 51 MG/DL
TSH SERPL-ACNC: 1.06 MIU/L (ref 0.4–4.5)
WBC # BLD AUTO: 4.6 THOUSAND/UL (ref 3.8–10.8)
WBC #/AREA URNS HPF: NORMAL /HPF

## 2023-09-26 ENCOUNTER — OFFICE VISIT (OUTPATIENT)
Dept: FAMILY MEDICINE | Facility: CLINIC | Age: 68
End: 2023-09-26
Payer: MEDICARE

## 2023-09-26 VITALS
SYSTOLIC BLOOD PRESSURE: 130 MMHG | DIASTOLIC BLOOD PRESSURE: 82 MMHG | BODY MASS INDEX: 27.98 KG/M2 | HEART RATE: 80 BPM | WEIGHT: 184.63 LBS | HEIGHT: 68 IN

## 2023-09-26 DIAGNOSIS — Z23 NEED FOR VACCINATION: ICD-10-CM

## 2023-09-26 DIAGNOSIS — E78.2 MIXED HYPERLIPIDEMIA: Primary | ICD-10-CM

## 2023-09-26 DIAGNOSIS — M17.12 PRIMARY OSTEOARTHRITIS OF LEFT KNEE: ICD-10-CM

## 2023-09-26 PROCEDURE — 90694 VACC AIIV4 NO PRSRV 0.5ML IM: CPT | Mod: S$GLB,,, | Performed by: NURSE PRACTITIONER

## 2023-09-26 PROCEDURE — 3008F BODY MASS INDEX DOCD: CPT | Mod: CPTII,S$GLB,, | Performed by: NURSE PRACTITIONER

## 2023-09-26 PROCEDURE — 1160F PR REVIEW ALL MEDS BY PRESCRIBER/CLIN PHARMACIST DOCUMENTED: ICD-10-PCS | Mod: CPTII,S$GLB,, | Performed by: NURSE PRACTITIONER

## 2023-09-26 PROCEDURE — G0008 ADMIN INFLUENZA VIRUS VAC: HCPCS | Mod: S$GLB,,, | Performed by: NURSE PRACTITIONER

## 2023-09-26 PROCEDURE — 3075F SYST BP GE 130 - 139MM HG: CPT | Mod: CPTII,S$GLB,, | Performed by: NURSE PRACTITIONER

## 2023-09-26 PROCEDURE — 3288F FALL RISK ASSESSMENT DOCD: CPT | Mod: CPTII,S$GLB,, | Performed by: NURSE PRACTITIONER

## 2023-09-26 PROCEDURE — 90694 FLU VACCINE - QUADRIVALENT - ADJUVANTED: ICD-10-PCS | Mod: S$GLB,,, | Performed by: NURSE PRACTITIONER

## 2023-09-26 PROCEDURE — 1101F PR PT FALLS ASSESS DOC 0-1 FALLS W/OUT INJ PAST YR: ICD-10-PCS | Mod: CPTII,S$GLB,, | Performed by: NURSE PRACTITIONER

## 2023-09-26 PROCEDURE — 99213 PR OFFICE/OUTPT VISIT, EST, LEVL III, 20-29 MIN: ICD-10-PCS | Mod: S$GLB,,, | Performed by: NURSE PRACTITIONER

## 2023-09-26 PROCEDURE — 99213 OFFICE O/P EST LOW 20 MIN: CPT | Mod: S$GLB,,, | Performed by: NURSE PRACTITIONER

## 2023-09-26 PROCEDURE — 1101F PT FALLS ASSESS-DOCD LE1/YR: CPT | Mod: CPTII,S$GLB,, | Performed by: NURSE PRACTITIONER

## 2023-09-26 PROCEDURE — G0008 FLU VACCINE - QUADRIVALENT - ADJUVANTED: ICD-10-PCS | Mod: S$GLB,,, | Performed by: NURSE PRACTITIONER

## 2023-09-26 PROCEDURE — 3079F PR MOST RECENT DIASTOLIC BLOOD PRESSURE 80-89 MM HG: ICD-10-PCS | Mod: CPTII,S$GLB,, | Performed by: NURSE PRACTITIONER

## 2023-09-26 PROCEDURE — 3008F PR BODY MASS INDEX (BMI) DOCUMENTED: ICD-10-PCS | Mod: CPTII,S$GLB,, | Performed by: NURSE PRACTITIONER

## 2023-09-26 PROCEDURE — 1159F PR MEDICATION LIST DOCUMENTED IN MEDICAL RECORD: ICD-10-PCS | Mod: CPTII,S$GLB,, | Performed by: NURSE PRACTITIONER

## 2023-09-26 PROCEDURE — 1160F RVW MEDS BY RX/DR IN RCRD: CPT | Mod: CPTII,S$GLB,, | Performed by: NURSE PRACTITIONER

## 2023-09-26 PROCEDURE — 3075F PR MOST RECENT SYSTOLIC BLOOD PRESS GE 130-139MM HG: ICD-10-PCS | Mod: CPTII,S$GLB,, | Performed by: NURSE PRACTITIONER

## 2023-09-26 PROCEDURE — 1159F MED LIST DOCD IN RCRD: CPT | Mod: CPTII,S$GLB,, | Performed by: NURSE PRACTITIONER

## 2023-09-26 PROCEDURE — 3079F DIAST BP 80-89 MM HG: CPT | Mod: CPTII,S$GLB,, | Performed by: NURSE PRACTITIONER

## 2023-09-26 PROCEDURE — 3288F PR FALLS RISK ASSESSMENT DOCUMENTED: ICD-10-PCS | Mod: CPTII,S$GLB,, | Performed by: NURSE PRACTITIONER

## 2023-09-26 NOTE — PROGRESS NOTES
SUBJECTIVE:    Patient ID: Teodoro Sosa is a 67 y.o. male.    Chief Complaint: Follow-up (No bottles//Pt is here for a check up//Pt would like his flu vaccine//IRIS )    Pt here for regular f/u- lipids, knee OA    Patient reports overall has been doing well. Since last visit here had left TKR by Dr. Lazo in April.  Reports did really well after surgery and pain is now resolved with full mobility.    No complaints today.  States has been tolerating rosuvastatin added at the beginning of the year and lipids are much improved on recent labs              Telephone on 09/13/2023   Component Date Value Ref Range Status    WBC 09/15/2023 4.6  3.8 - 10.8 Thousand/uL Final    RBC 09/15/2023 5.03  4.20 - 5.80 Million/uL Final    Hemoglobin 09/15/2023 14.9  13.2 - 17.1 g/dL Final    Hematocrit 09/15/2023 45.8  38.5 - 50.0 % Final    MCV 09/15/2023 91.1  80.0 - 100.0 fL Final    MCH 09/15/2023 29.6  27.0 - 33.0 pg Final    MCHC 09/15/2023 32.5  32.0 - 36.0 g/dL Final    RDW 09/15/2023 13.3  11.0 - 15.0 % Final    Platelets 09/15/2023 170  140 - 400 Thousand/uL Final    MPV 09/15/2023 10.6  7.5 - 12.5 fL Final    Neutrophils, Abs 09/15/2023 2,387  1,500 - 7,800 cells/uL Final    Lymph # 09/15/2023 1,569  850 - 3,900 cells/uL Final    Mono # 09/15/2023 474  200 - 950 cells/uL Final    Eos # 09/15/2023 129  15 - 500 cells/uL Final    Baso # 09/15/2023 41  0 - 200 cells/uL Final    Neutrophils Relative 09/15/2023 51.9  % Final    Lymph % 09/15/2023 34.1  % Final    Mono % 09/15/2023 10.3  % Final    Eosinophil % 09/15/2023 2.8  % Final    Basophil % 09/15/2023 0.9  % Final    Glucose 09/15/2023 96  65 - 99 mg/dL Final    BUN 09/15/2023 20  7 - 25 mg/dL Final    Creatinine 09/15/2023 0.88  0.70 - 1.35 mg/dL Final    eGFR 09/15/2023 94  > OR = 60 mL/min/1.73m2 Final    BUN/Creatinine Ratio 09/15/2023 SEE NOTE:  6 - 22 (calc) Final    Sodium 09/15/2023 139  135 - 146 mmol/L Final    Potassium 09/15/2023 4.7  3.5 - 5.3 mmol/L  Final    Chloride 09/15/2023 104  98 - 110 mmol/L Final    CO2 09/15/2023 27  20 - 32 mmol/L Final    Calcium 09/15/2023 9.5  8.6 - 10.3 mg/dL Final    Total Protein 09/15/2023 6.9  6.1 - 8.1 g/dL Final    Albumin 09/15/2023 4.4  3.6 - 5.1 g/dL Final    Globulin, Total 09/15/2023 2.5  1.9 - 3.7 g/dL (calc) Final    Albumin/Globulin Ratio 09/15/2023 1.8  1.0 - 2.5 (calc) Final    Total Bilirubin 09/15/2023 0.5  0.2 - 1.2 mg/dL Final    Alkaline Phosphatase 09/15/2023 59  35 - 144 U/L Final    AST 09/15/2023 16  10 - 35 U/L Final    ALT 09/15/2023 23  9 - 46 U/L Final    Cholesterol 09/15/2023 179  <200 mg/dL Final    HDL 09/15/2023 67  > OR = 40 mg/dL Final    Triglycerides 09/15/2023 51  <150 mg/dL Final    LDL Cholesterol 09/15/2023 99  mg/dL (calc) Final    HDL/Cholesterol Ratio 09/15/2023 2.7  <5.0 (calc) Final    Non HDL Chol. (LDL+VLDL) 09/15/2023 112  <130 mg/dL (calc) Final    Color, UA 09/15/2023 YELLOW  YELLOW Final    Appearance, UA 09/15/2023 CLEAR  CLEAR Final    Specific [place], UA 09/15/2023 1.021  1.001 - 1.035 Final    pH, UA 09/15/2023 6.0  5.0 - 8.0 Final    Glucose, UA 09/15/2023 NEGATIVE  NEGATIVE Final    Bilirubin, UA 09/15/2023 NEGATIVE  NEGATIVE Final    Ketones, UA 09/15/2023 NEGATIVE  NEGATIVE Final    Occult Blood UA 09/15/2023 NEGATIVE  NEGATIVE Final    Protein, UA 09/15/2023 NEGATIVE  NEGATIVE Final    Nitrite, UA 09/15/2023 NEGATIVE  NEGATIVE Final    Leukocytes, UA 09/15/2023 NEGATIVE  NEGATIVE Final    WBC Casts, UA 09/15/2023 NONE SEEN  < OR = 5 /HPF Final    RBC Casts, UA 09/15/2023 NONE SEEN  < OR = 2 /HPF Final    Squam Epithel, UA 09/15/2023 NONE SEEN  < OR = 5 /HPF Final    Bacteria, UA 09/15/2023 NONE SEEN  NONE SEEN /HPF Final    Hyaline Casts, UA 09/15/2023 NONE SEEN  NONE SEEN /LPF Final    Service Cmt: 09/15/2023    Final    Reflexive Urine Culture 09/15/2023    Final    TSH w/reflex to FT4 09/15/2023 1.06  0.40 - 4.50 mIU/L Final    PROSTATE SPECIFIC ANTIGEN, SCR - Q*  09/15/2023 0.38  < OR = 4.00 ng/mL Final       Past Medical History:   Diagnosis Date    Fractures 1982    right leg -femur-    Hyperlipidemia     Primary osteoarthritis of left knee 04/28/2022     Past Surgical History:   Procedure Laterality Date    KNEE ARTHROSCOPY W/ MENISCECTOMY Left 11/24/2021    Procedure: ARTHROSCOPY, KNEE, WITH MENISCECTOMY;  Surgeon: Tobias Rich MD;  Location: [facility];  Service: Orthopedics;  Laterality: Left;  notified Arthrex 11/18, wm    TONSILLECTOMY  1963    TOTAL KNEE ARTHROPLASTY Left 04/2023    UMBILICAL HERNIA REPAIR      x2 umbilical left inguinal     History reviewed. No pertinent family history.    All of your core healthy metrics are met.      The 10-year CVD risk score (D'Agostino, et al., 2008) is: 13.4%    Values used to calculate the score:      Age: 67 years      Sex: Male      Diabetic: No      Tobacco smoker: No      Systolic Blood Pressure: 130 mmHg      Is BP treated: No      HDL Cholesterol: 67 mg/dL      Total Cholesterol: 179 mg/dL     Marital Status:   Alcohol History:  reports current alcohol use.  Tobacco History:  reports that he has never smoked. He has never been exposed to tobacco smoke. He has never used smokeless tobacco.  Drug History:  reports no history of drug use.    Health Maintenance Topics with due status: Not Due       Topic Last Completion Date    Colorectal Cancer Screening 08/11/2016    Hemoglobin A1c (Diabetic Prevention Screening) 12/29/2020    Lipid Panel 09/15/2023     Immunization History   Administered Date(s) Administered    Influenza (FLUAD) - Quadrivalent - Adjuvanted - PF *Preferred* (65+) 09/26/2023    Pneumococcal Conjugate - 20 Valent 03/30/2023       Review of patient's allergies indicates:  No Known Allergies    Current Outpatient Medications:     omega-3 acid ethyl esters (LOVAZA) 1 gram capsule, Take 2 capsules (2 g total) by mouth 2 (two) times daily., Disp: 360 capsule, Rfl: 3    rosuvastatin (CRESTOR) 5 MG tablet,  "Take 1 tablet (5 mg total) by mouth once daily. For cholesterol, Disp: 90 tablet, Rfl: 3    oxyCODONE-acetaminophen (PERCOCET) 7.5-325 mg per tablet, Take 1 tablet by mouth every 4 (four) hours as needed for Pain. (Patient not taking: Reported on 12/8/2021), Disp: 28 tablet, Rfl: 0    Review of Systems   Constitutional:  Negative for appetite change, chills, fever and unexpected weight change.   HENT:  Negative for sore throat and trouble swallowing.    Respiratory:  Negative for cough, shortness of breath and wheezing.    Cardiovascular:  Negative for chest pain, palpitations and leg swelling.   Gastrointestinal:  Negative for abdominal pain, constipation, diarrhea, nausea and vomiting.   Genitourinary:  Negative for dysuria, frequency and hematuria.   Musculoskeletal:  Negative for arthralgias (left knee pain resolved).   Skin:  Negative for rash.   Neurological:  Negative for dizziness, syncope, numbness and headaches.   Psychiatric/Behavioral:  Negative for dysphoric mood and sleep disturbance. The patient is not nervous/anxious.           Objective:      Vitals:    09/26/23 1439   BP: 130/82   Pulse: 80   Weight: 83.7 kg (184 lb 9.6 oz)   Height: 5' 8" (1.727 m)     Physical Exam  Vitals reviewed.   Constitutional:       General: He is not in acute distress.     Appearance: Normal appearance. He is well-developed.   HENT:      Head: Normocephalic and atraumatic.      Right Ear: Tympanic membrane and ear canal normal.      Left Ear: Tympanic membrane and ear canal normal.   Neck:      Vascular: No carotid bruit.   Cardiovascular:      Rate and Rhythm: Normal rate and regular rhythm.      Heart sounds: No murmur heard.  Pulmonary:      Effort: Pulmonary effort is normal. No respiratory distress.      Breath sounds: Normal breath sounds. No wheezing or rales.   Abdominal:      General: There is no distension.      Palpations: Abdomen is soft.      Tenderness: There is no abdominal tenderness.   Musculoskeletal:    "   Cervical back: Neck supple.      Left knee: No effusion or erythema.      Right lower leg: No edema.      Left lower leg: No edema.   Lymphadenopathy:      Cervical: No cervical adenopathy.   Skin:     General: Skin is warm and dry.      Findings: No rash.   Neurological:      General: No focal deficit present.      Mental Status: He is alert and oriented to person, place, and time.      Gait: Gait normal.   Psychiatric:         Mood and Affect: Mood normal.           Assessment:       1. Mixed hyperlipidemia    2. Primary osteoarthritis of left knee    3. Need for vaccination           Plan:       1. Mixed hyperlipidemia   -reviewed recent labs with patient, improved on statin    2. Primary osteoarthritis of left knee   -patient reports doing very well status post left TKR    3. Need for vaccination  -     Influenza - Quadrivalent (Adjuvanted)      Follow up in about 1 year (around 9/26/2024) for annual visit, lipids.          Counseled on age and gender appropriate medical preventative services, including cancer screenings, immunizations, overall nutritional health, need for a consistent exercise regimen and an overall push towards maintaining a vigorous and active lifestyle.      9/26/2023 Shanti Guzman NP

## 2024-02-27 DIAGNOSIS — Z00.00 ENCOUNTER FOR MEDICARE ANNUAL WELLNESS EXAM: ICD-10-CM

## 2024-04-03 DIAGNOSIS — E78.2 MIXED HYPERLIPIDEMIA: ICD-10-CM

## 2024-04-03 RX ORDER — ROSUVASTATIN CALCIUM 5 MG/1
5 TABLET, COATED ORAL DAILY
Qty: 90 TABLET | Refills: 3 | Status: SHIPPED | OUTPATIENT
Start: 2024-04-03

## 2024-06-20 ENCOUNTER — TELEPHONE (OUTPATIENT)
Dept: ADMINISTRATIVE | Facility: CLINIC | Age: 69
End: 2024-06-20
Payer: MEDICARE

## 2024-06-21 ENCOUNTER — OFFICE VISIT (OUTPATIENT)
Dept: FAMILY MEDICINE | Facility: CLINIC | Age: 69
End: 2024-06-21
Payer: MEDICARE

## 2024-06-21 VITALS
WEIGHT: 183.88 LBS | HEART RATE: 84 BPM | DIASTOLIC BLOOD PRESSURE: 72 MMHG | SYSTOLIC BLOOD PRESSURE: 126 MMHG | BODY MASS INDEX: 27.87 KG/M2 | TEMPERATURE: 99 F | OXYGEN SATURATION: 97 % | HEIGHT: 68 IN

## 2024-06-21 DIAGNOSIS — Z00.00 ENCOUNTER FOR MEDICARE ANNUAL WELLNESS EXAM: ICD-10-CM

## 2024-06-21 DIAGNOSIS — Z00.00 ENCOUNTER FOR PREVENTIVE HEALTH EXAMINATION: Primary | ICD-10-CM

## 2024-06-21 PROCEDURE — 99999 PR PBB SHADOW E&M-EST. PATIENT-LVL IV: CPT | Mod: PBBFAC,HCNC,, | Performed by: NURSE PRACTITIONER

## 2024-06-21 NOTE — PATIENT INSTRUCTIONS
Counseling and Referral of Other Preventative  (Italic type indicates deductible and co-insurance are waived)    Patient Name: Teodoro Sosa  Today's Date: 6/21/2024    Health Maintenance       Date Due Completion Date    Hepatitis C Screening Never done ---    Shingles Vaccine (1 of 2) Never done ---    RSV Vaccine (Age 60+ and Pregnant patients) (1 - 1-dose 60+ series) Never done ---    COVID-19 Vaccine (3 - 2023-24 season) 09/01/2023 4/27/2021    Hemoglobin A1c (Diabetic Prevention Screening) 12/29/2023 12/29/2020    TETANUS VACCINE 09/16/2025 9/16/2015    Colorectal Cancer Screening 08/11/2026 8/11/2016    Lipid Panel 09/15/2028 9/15/2023        No orders of the defined types were placed in this encounter.      The following information is provided to all patients.  This information is to help you find resources for any of the problems found today that may be affecting your health:                  Living healthy guide: www.[facility].[place].gov      Understanding Diabetes: www.diabetes.org      Eating healthy: www.cdc.gov/healthyweight      [facility] home safety checklist: www.cdc.gov/steadi/patient.html      Agency on Aging: www.goea.[place].gov      Alcoholics anonymous (AA): www.aa.org      Physical Activity: www.maritza.nih.gov/rx6wurw      Tobacco use: www.quitwithusla.org

## 2024-06-21 NOTE — PROGRESS NOTES
"  Teodoro Sosa presented for a  Medicare AWV and comprehensive Health Risk Assessment today. The following components were reviewed and updated:    Medical history  Family History  Social history  Allergies and Current Medications  Health Risk Assessment  Health Maintenance  Care Team         ** See Completed Assessments for Annual Wellness Visit within the encounter summary.**         The following assessments were completed:  Living Situation  CAGE  Depression Screening  Timed Get Up and Go  Whisper Test  Cognitive Function Screening  Nutrition Screening  ADL Screening  PAQ Screening    Clock in media   Opioid documentation:      Patient does not have a current opioid prescription.        Vitals:    06/21/24 1452   BP: 126/72   Pulse: 84   Temp: 98.7 °F (37.1 °C)   TempSrc: Oral   SpO2: 97%   Weight: 83.4 kg (183 lb 13.8 oz)   Height: 5' 8" (1.727 m)     Body mass index is 27.96 kg/m².  Physical Exam  Constitutional:       Appearance: He is well-developed.   HENT:      Head: Normocephalic and atraumatic.      Right Ear: Hearing normal.      Left Ear: Hearing normal.      Nose: Nose normal.   Eyes:      General: Lids are normal.      Conjunctiva/sclera: Conjunctivae normal.      Pupils: Pupils are equal, round, and reactive to light.   Cardiovascular:      Rate and Rhythm: Normal rate.   Pulmonary:      Effort: Pulmonary effort is normal.   Abdominal:      Palpations: Abdomen is soft.   Musculoskeletal:         General: Normal range of motion.      Cervical back: Normal range of motion and neck supple.   Skin:     General: Skin is warm and dry.   Neurological:      Mental Status: He is alert and oriented to person, place, and time.               Diagnoses and health risks identified today and associated recommendations/orders:    1. Encounter for preventive health examination  Discussed health maintenance guidelines appropriate for age.        2. Encounter for Medicare annual wellness exam    - Ambulatory " Referral/Consult to Enhanced Annual Wellness Visit (eAWV)      Provided Teodoro with a 5-10 year written screening schedule and personal prevention plan. Recommendations were developed using the USPSTF age appropriate recommendations. Education, counseling, and referrals were provided as needed. After Visit Summary printed and given to patient which includes a list of additional screenings\tests needed.    Follow up for One year for Annual Wellness Visit.    Tatianna Love, NP      I offered to discuss advanced care planning, including how to pick a person who would make decisions for you if you were unable to make them for yourself, called a health care power of , and what kind of decisions you might make such as use of life sustaining treatments such as ventilators and tube feeding when faced with a life limiting illness recorded on a living will that they will need to know. (How you want to be cared for as you near the end of your natural life)     X Patient is interested in learning more about how to make advanced directives.  I provided them paperwork and offered to discuss this with them.

## 2024-09-19 ENCOUNTER — TELEPHONE (OUTPATIENT)
Dept: FAMILY MEDICINE | Facility: CLINIC | Age: 69
End: 2024-09-19
Payer: MEDICARE

## 2024-09-19 DIAGNOSIS — E78.2 MIXED HYPERLIPIDEMIA: ICD-10-CM

## 2024-09-19 DIAGNOSIS — Z79.899 ENCOUNTER FOR LONG-TERM (CURRENT) USE OF OTHER MEDICATIONS: Primary | ICD-10-CM

## 2024-09-19 DIAGNOSIS — Z12.5 SPECIAL SCREENING FOR MALIGNANT NEOPLASM OF PROSTATE: ICD-10-CM

## 2024-09-19 NOTE — TELEPHONE ENCOUNTER
Spoke to patient that fasting lab and urine is due a week prior to visit, orders at [facility], encouraged water. Advised he can take morning meds that do not need to be taken with food.    Said that he is out of town the day of his appt and needs to reschedule. Dr Casillas has nothing rest of year. I tried to put him with Shanti who he saw last, but patient said he can't see her due to insurance. Please reschedule his appt.

## 2024-10-10 ENCOUNTER — TELEPHONE (OUTPATIENT)
Dept: FAMILY MEDICINE | Facility: CLINIC | Age: 69
End: 2024-10-10
Payer: MEDICARE

## 2024-10-24 ENCOUNTER — OFFICE VISIT (OUTPATIENT)
Dept: FAMILY MEDICINE | Facility: CLINIC | Age: 69
End: 2024-10-24
Attending: FAMILY MEDICINE
Payer: MEDICARE

## 2024-10-24 VITALS
SYSTOLIC BLOOD PRESSURE: 124 MMHG | HEIGHT: 68 IN | BODY MASS INDEX: 27.89 KG/M2 | WEIGHT: 184 LBS | HEART RATE: 68 BPM | OXYGEN SATURATION: 98 % | DIASTOLIC BLOOD PRESSURE: 80 MMHG

## 2024-10-24 DIAGNOSIS — E78.2 MIXED HYPERLIPIDEMIA: Primary | ICD-10-CM

## 2024-10-24 DIAGNOSIS — Z23 NEED FOR INFLUENZA VACCINATION: ICD-10-CM

## 2024-10-24 DIAGNOSIS — M17.12 PRIMARY OSTEOARTHRITIS OF LEFT KNEE: ICD-10-CM

## 2024-10-24 DIAGNOSIS — L57.0 ACTINIC KERATOSES: ICD-10-CM

## 2024-10-24 RX ORDER — ROSUVASTATIN CALCIUM 5 MG/1
5 TABLET, COATED ORAL DAILY
Qty: 90 TABLET | Refills: 3 | Status: SHIPPED | OUTPATIENT
Start: 2024-10-24

## 2024-10-24 RX ORDER — OMEGA-3-ACID ETHYL ESTERS 1 G/1
2 CAPSULE, LIQUID FILLED ORAL 2 TIMES DAILY
Qty: 360 CAPSULE | Refills: 3 | Status: SHIPPED | OUTPATIENT
Start: 2024-10-24

## 2024-10-24 NOTE — PROCEDURES
"Destruction, Premalignant Lesion    Date/Time: 10/24/2024 7:40 AM    Performed by: Pritesh Casillas MD  Authorized by: Pritesh Casillas MD    Consent Done?:  Yes (Verbal)  Time out: Immediately prior to procedure a "time out" was called to verify the correct patient, procedure, equipment, support staff and site/side marked as required.    Location(s):    Upper Extremity:  Hand        Detail:  left hand  Number of lesions:  1  Destruction method:  Cryotherapy  Bleeding:  None   Patient tolerated the procedure well with no immediate complications.    "

## 2024-10-24 NOTE — PROGRESS NOTES
"  SUBJECTIVE:    Patient ID: Teodoro Sosa is a 69 y.o. male.    Chief Complaint: Annual Exam (Review Lab-results, declined flu vaccine, no bottles, need refills, abc )    HPI    History of Present Illness    CHIEF COMPLAINT:  Teodoro presents today for a routine follow-up visit.    MUSCULOSKELETAL:  He reports a successful left knee replacement with no current pain. The right knee experiences mild aching, particularly when ascending and descending stairs, but he denies limping or swelling. He notes that the left knee was previously more painful, describing it as "bone-on-bone" before replacement. He also mentions occasional soreness in the right shoulder, which has been ongoing for some time, but denies any significant issues with the left shoulder or rotator cuff.    OCCUPATIONAL AND RECREATIONAL ACTIVITIES:  He maintains an active lifestyle, regularly attending the gym and working in residential construction. His job involves physical tasks such as lifting and climbing. He also engages in hunting activities, particularly during deer season, and acknowledges the importance of wearing orange for safety during big game hunting.    GASTROINTESTINAL AND URINARY HEALTH:  He denies experiencing heartburn or indigestion. He reports regular bowel movements, occurring twice daily. His last colonoscopy was in 2016. He reports no urinary symptoms, denies experiencing any kidney stones, and states he is urinating well.    MEDICATIONS:  He takes rosuvastatin for cholesterol management and denies experiencing any current muscle cramps or soreness, though he recalls having back soreness with a previous cholesterol medication. He also takes Lovasa (fish oil) as prescribed.    DERMATOLOGICAL:  He reports a persistent skin lesion on his left hand that recurs due to frequent irritation from wearing a nail bag and digging at work. The lesion heals when covered with a Band-Aid but reappears when scratched during work activities. " He denies any other skin issues on the face or ears. He recently recovered from a case of poison ivy.    SURGICAL HISTORY:  He reports a history of umbilical and lateral hernia repairs performed by Dr. Diane. He denies feeling any recurrence of hernias upon exam and coughing.    SOCIAL HISTORY:  He denies smoking and reports occasional beer consumption, but not in excessive amounts.      ROS:  Constitutional: -appetite change, -chills, -fatigue, -fever, -unexpected weight change  HENT: -ear pain, -trouble swallowing  Eyes: -pain, -discharge, -visual disturbance  Respiratory: -apnea, -cough, -shortness of breath, -wheezing  Cardiovascular: -chest pain, -leg swelling  Gastrointestinal: -abdominal pain, -blood in stool, -constipation, -diarrhea, -nausea, -vomiting, -reflux, -change in bowel habits  Endocrine: -cold intolerance, -heat intolerance, -polydipsia  Genitourinary: -bladder incontinence, -dysuria, -erectile dysfunction, -frequency, -hematuria, -testicular pain, -urgency, -nocturia  Musculoskeletal: -gait problem, -joint swelling, -myalgia, +joint pain, -muscle cramps  Neurological: -dizziness, -seizures, -numbness  Psychiatric/Behavioral: -agitation, -hallucinations, -nervous, -anxiety symptoms  Skin: -rash         Telephone on 09/19/2024   Component Date Value Ref Range Status    PROSTATE SPECIFIC ANTIGEN, SCR - Q* 10/21/2024 0.44  < OR = 4.00 ng/mL Final    TSH w/reflex to FT4 10/21/2024 1.18  0.40 - 4.50 mIU/L Final    Color, UA 10/21/2024 YELLOW  YELLOW Final    Appearance, UA 10/21/2024 CLEAR  CLEAR Final    Specific Gravity, UA 10/21/2024 1.022  1.001 - 1.035 Final    pH, UA 10/21/2024 5.5  5.0 - 8.0 Final    Glucose, UA 10/21/2024 NEGATIVE  NEGATIVE Final    Bilirubin, UA 10/21/2024 NEGATIVE  NEGATIVE Final    Ketones, UA 10/21/2024 NEGATIVE  NEGATIVE Final    Occult Blood UA 10/21/2024 NEGATIVE  NEGATIVE Final    Protein, UA 10/21/2024 NEGATIVE  NEGATIVE Final    Nitrite, UA 10/21/2024 NEGATIVE   NEGATIVE Final    Leukocytes, UA 10/21/2024 NEGATIVE  NEGATIVE Final    WBC Casts, UA 10/21/2024 NONE SEEN  < OR = 5 /HPF Final    RBC Casts, UA 10/21/2024 NONE SEEN  < OR = 2 /HPF Final    Squam Epithel, UA 10/21/2024 NONE SEEN  < OR = 5 /HPF Final    Bacteria, UA 10/21/2024 NONE SEEN  NONE SEEN /HPF Final    Hyaline Casts, UA 10/21/2024 NONE SEEN  NONE SEEN /LPF Final    Service Cmt: 10/21/2024 SEE COMMENT   Final    Reflexive Urine Culture 10/21/2024 SEE COMMENT   Final    Cholesterol 10/21/2024 171  <200 mg/dL Final    HDL 10/21/2024 56  > OR = 40 mg/dL Final    Triglycerides 10/21/2024 59  <150 mg/dL Final    LDL Cholesterol 10/21/2024 101 (H)  mg/dL (calc) Final    HDL/Cholesterol Ratio 10/21/2024 3.1  <5.0 (calc) Final    Non HDL Chol. (LDL+VLDL) 10/21/2024 115  <130 mg/dL (calc) Final    Glucose 10/21/2024 90  65 - 99 mg/dL Final    BUN 10/21/2024 17  7 - 25 mg/dL Final    Creatinine 10/21/2024 0.80  0.70 - 1.35 mg/dL Final    eGFR 10/21/2024 96  > OR = 60 mL/min/1.73m2 Final    BUN/Creatinine Ratio 10/21/2024 SEE NOTE:  6 - 22 (calc) Final    Sodium 10/21/2024 137  135 - 146 mmol/L Final    Potassium 10/21/2024 4.5  3.5 - 5.3 mmol/L Final    Chloride 10/21/2024 103  98 - 110 mmol/L Final    CO2 10/21/2024 27  20 - 32 mmol/L Final    Calcium 10/21/2024 9.5  8.6 - 10.3 mg/dL Final    Total Protein 10/21/2024 6.8  6.1 - 8.1 g/dL Final    Albumin 10/21/2024 4.2  3.6 - 5.1 g/dL Final    Globulin, Total 10/21/2024 2.6  1.9 - 3.7 g/dL (calc) Final    Albumin/Globulin Ratio 10/21/2024 1.6  1.0 - 2.5 (calc) Final    Total Bilirubin 10/21/2024 0.6  0.2 - 1.2 mg/dL Final    Alkaline Phosphatase 10/21/2024 66  35 - 144 U/L Final    AST 10/21/2024 16  10 - 35 U/L Final    ALT 10/21/2024 19  9 - 46 U/L Final    WBC 10/21/2024 4.7  3.8 - 10.8 Thousand/uL Final    RBC 10/21/2024 5.00  4.20 - 5.80 Million/uL Final    Hemoglobin 10/21/2024 14.8  13.2 - 17.1 g/dL Final    Hematocrit 10/21/2024 47.1  38.5 - 50.0 % Final    MCV  10/21/2024 94.2  80.0 - 100.0 fL Final    MCH 10/21/2024 29.6  27.0 - 33.0 pg Final    MCHC 10/21/2024 31.4 (L)  32.0 - 36.0 g/dL Final    RDW 10/21/2024 12.5  11.0 - 15.0 % Final    Platelets 10/21/2024 219  140 - 400 Thousand/uL Final    MPV 10/21/2024 9.8  7.5 - 12.5 fL Final    Neutrophils, Abs 10/21/2024 2,308  1,500 - 7,800 cells/uL Final    Lymph # 10/21/2024 1,654  850 - 3,900 cells/uL Final    Mono # 10/21/2024 503  200 - 950 cells/uL Final    Eos # 10/21/2024 193  15 - 500 cells/uL Final    Baso # 10/21/2024 42  0 - 200 cells/uL Final    Neutrophils Relative 10/21/2024 49.1  % Final    Lymph % 10/21/2024 35.2  % Final    Mono % 10/21/2024 10.7  % Final    Eosinophil % 10/21/2024 4.1  % Final    Basophil % 10/21/2024 0.9  % Final       Past Medical History:   Diagnosis Date    Fractures 1982    right leg -femur-    Hyperlipidemia     Primary osteoarthritis of left knee 04/28/2022     Social History     Socioeconomic History    Marital status:    Tobacco Use    Smoking status: Never     Passive exposure: Never    Smokeless tobacco: Never   Substance and Sexual Activity    Alcohol use: Yes     Comment: soc    Drug use: Never     Social Drivers of Health     Financial Resource Strain: Low Risk  (6/21/2024)    Overall Financial Resource Strain (CARDIA)     Difficulty of Paying Living Expenses: Not hard at all   Food Insecurity: No Food Insecurity (6/21/2024)    Hunger Vital Sign     Worried About Running Out of Food in the Last Year: Never true     Ran Out of Food in the Last Year: Never true   Transportation Needs: No Transportation Needs (6/21/2024)    PRAPARE - Transportation     Lack of Transportation (Medical): No     Lack of Transportation (Non-Medical): No   Physical Activity: Sufficiently Active (6/21/2024)    Exercise Vital Sign     Days of Exercise per Week: 4 days     Minutes of Exercise per Session: 60 min   Stress: No Stress Concern Present (6/21/2024)    [employer]  "Health - Occupational Stress Questionnaire     Feeling of Stress : Not at all   Housing Stability: Low Risk  (6/21/2024)    Housing Stability Vital Sign     Unable to Pay for Housing in the Last Year: No     Homeless in the Last Year: No     Past Surgical History:   Procedure Laterality Date    KNEE ARTHROSCOPY W/ MENISCECTOMY Left 11/24/2021    Procedure: ARTHROSCOPY, KNEE, WITH MENISCECTOMY;  Surgeon: Tobias Rich MD;  Location: [facility];  Service: Orthopedics;  Laterality: Left;  notified [facility] 11/18, wm    TONSILLECTOMY  1963    TOTAL KNEE ARTHROPLASTY Left 04/2023    UMBILICAL HERNIA REPAIR      x2 umbilical left inguinal     No family history on file.    The 10-year CVD risk score (DILSHAD'Agostino, et al., 2008) is: 14.9%    Values used to calculate the score:      Age: 69 years      Sex: Male      Diabetic: No      Tobacco smoker: No      Systolic Blood Pressure: 124 mmHg      Is BP treated: No      HDL Cholesterol: 56 mg/dL      Total Cholesterol: 171 mg/dL    All of your core healthy metrics are met.      Review of patient's allergies indicates:  No Known Allergies    Current Outpatient Medications:     omega-3 acid ethyl esters (LOVAZA) 1 gram capsule, Take 2 capsules (2 g total) by mouth 2 (two) times daily., Disp: 360 capsule, Rfl: 3    rosuvastatin (CRESTOR) 5 MG tablet, Take 1 tablet (5 mg total) by mouth once daily. For cholesterol, Disp: 90 tablet, Rfl: 3  No current facility-administered medications for this visit.    Review of Systems        Objective:      Vitals:    10/24/24 0751   BP: 124/80   Pulse: 68   SpO2: 98%   Weight: 83.5 kg (184 lb)   Height: 5' 8" (1.727 m)     Physical Exam  Physical Exam    HENT: Tympanic membranes normal bilaterally. External ears normal bilaterally.  Pulmonary: Pulmonary effort is normal. Normal breath sounds. No wheezing. No rales.  Abdomen: No hernia detected.  Musculoskeletal: Good flexibility, able to bend and touch toes.  Skin: Scaly spots on skin.         " "    Assessment:       1. Mixed hyperlipidemia    2. Need for influenza vaccination    3. Primary osteoarthritis of left knee    4. Actinic keratoses         Plan:       Mixed hyperlipidemia  -     rosuvastatin (CRESTOR) 5 MG tablet; Take 1 tablet (5 mg total) by mouth once daily. For cholesterol  Dispense: 90 tablet; Refill: 3  -     omega-3 acid ethyl esters (LOVAZA) 1 gram capsule; Take 2 capsules (2 g total) by mouth 2 (two) times daily.  Dispense: 360 capsule; Refill: 3  Cholesterol excellent at 171 HDL 56 TG 59  continue Lovaza and rosuvastatin for cholesterol  Need for influenza vaccination  -     Discontinue: influenza (adjuvanted) (Fluad) 45 mcg/0.5 mL IM vaccine (> or = 64 yo) 0.5 mL  Flu vaccine not available today  Primary osteoarthritis of left knee  Managing is arthritis conservatively  Actinic keratoses-cryo surgery performed on the left dorsal hand  Assessment & Plan    Assessed patient's overall health status, noting excellent cholesterol levels, perfect blood sugar, kidney, liver, thyroid, and prostate function  Evaluated right knee pain, noting mild ache without swelling or significant impairment  Identified precancerous skin lesion on patient's hand, decided to treat with cryotherapy  Considered potential for skin cancer on recurring lesion if it persists after treatment    ARTIFICIAL KNEE JOINT:  - Explained normal "plunk" sound in artificial knee joint.    PRECANCEROUS SKIN LESION:  - Discussed process of cryotherapy for precancerous skin lesion, including expected blistering, scabbing, and peeling.  - Performed cryotherapy on precancerous skin lesion on hand.  - Referred to Dr. De La Torre (dermatologist) if skin lesion persists after cryotherapy treatment.  - Contact the office if skin lesion does not improve after cryotherapy treatment.    SHINGLES VACCINATION:  - Informed patient about shingles vaccine recommendation for those who had chickenpox.  - Teodoro to consider getting shingles " vaccine at local pharmacy (2-step process, 3 months apart).    MEDICATIONS/SUPPLEMENTS:  - Continued rosuvastatin at current dose.  - Continued Lovasa (fish oil) at current dose.  - Refilled both medications for 90 days with multiple refills.    INFLUENZA VACCINATION:  - Ordered flu vaccine to be administered by nurse during visit.    FOLLOW UP:  - Follow up in 1 year if health status remains stable.         Follow up in about 1 year (around 10/24/2025), or Hyperlipidemia, for Annual Physical.        This note was generated with the assistance of ambient listening technology. Verbal consent was obtained by the patient and accompanying visitor(s) for the recording of patient appointment to facilitate this note. I attest to having reviewed and edited the generated note for accuracy, though some syntax or spelling errors may persist. Please contact the author of this note for any clarification.      10/24/2024 Pritesh Casillas

## 2025-04-01 DIAGNOSIS — E78.2 MIXED HYPERLIPIDEMIA: ICD-10-CM

## 2025-04-01 RX ORDER — ROSUVASTATIN CALCIUM 5 MG/1
5 TABLET, COATED ORAL DAILY
Qty: 90 TABLET | Refills: 3 | Status: SHIPPED | OUTPATIENT
Start: 2025-04-01

## (undated) DEVICE — GLOVE BIOGEL PI GOLD SZ  8.5

## (undated) DEVICE — PACK ARTHROSCOPY SMHS009-07

## (undated) DEVICE — UNDERGLOVE BIOGEL MICRO BLUE SZ 8.5

## (undated) DEVICE — PAD BOVIE ADULT

## (undated) DEVICE — DRESSING ADAPTIC 3X3 6112

## (undated) DEVICE — SPONGE GAUZE 10S 4X4  442214

## (undated) DEVICE — SOLUTION IRRI NS BOTTLE 1000ML R5200-01

## (undated) DEVICE — SOLUTION NACL 0.9% 3000ML

## (undated) DEVICE — PADDING CAST 6 STERILE 30-322

## (undated) DEVICE — BANDAGE ACE STERILE 6 REB3116

## (undated) DEVICE — TUBING CYSTO DOUBLE 654301

## (undated) DEVICE — CUFF TOURNIQUET 34DUAL PRT 5921-034-235